# Patient Record
Sex: MALE | NOT HISPANIC OR LATINO | Employment: FULL TIME | URBAN - METROPOLITAN AREA
[De-identification: names, ages, dates, MRNs, and addresses within clinical notes are randomized per-mention and may not be internally consistent; named-entity substitution may affect disease eponyms.]

---

## 2019-07-31 ENCOUNTER — OFFICE VISIT (OUTPATIENT)
Dept: FAMILY MEDICINE | Facility: CLINIC | Age: 39
End: 2019-07-31
Payer: COMMERCIAL

## 2019-07-31 VITALS
DIASTOLIC BLOOD PRESSURE: 69 MMHG | HEART RATE: 69 BPM | HEIGHT: 66 IN | WEIGHT: 202 LBS | BODY MASS INDEX: 32.47 KG/M2 | SYSTOLIC BLOOD PRESSURE: 109 MMHG | TEMPERATURE: 97.4 F

## 2019-07-31 DIAGNOSIS — M54.50 CHRONIC LEFT-SIDED LOW BACK PAIN WITHOUT SCIATICA: Primary | ICD-10-CM

## 2019-07-31 DIAGNOSIS — R07.9 CHEST PAIN, UNSPECIFIED TYPE: ICD-10-CM

## 2019-07-31 DIAGNOSIS — G89.29 CHRONIC LEFT-SIDED LOW BACK PAIN WITHOUT SCIATICA: Primary | ICD-10-CM

## 2019-07-31 PROCEDURE — 99203 OFFICE O/P NEW LOW 30 MIN: CPT | Performed by: PHYSICIAN ASSISTANT

## 2019-07-31 RX ORDER — METHOCARBAMOL 750 MG/1
750 TABLET, FILM COATED ORAL 4 TIMES DAILY PRN
Qty: 30 TABLET | Refills: 0 | Status: SHIPPED | OUTPATIENT
Start: 2019-07-31 | End: 2021-06-22

## 2019-07-31 ASSESSMENT — MIFFLIN-ST. JEOR: SCORE: 1761.08

## 2019-07-31 NOTE — PROGRESS NOTES
"Subjective     Maverick Richards is a 39 year old male who presents to clinic today for the following health issues:    HPI   ABDOMINAL   PAIN     Onset: 1 year off and on     Description:   Character: Sharp and Cramping  Location: left upper quadrant  Radiation: Back  L BACK     Intensity: 5-6 /10    Progression of Symptoms:  worsening    Accompanying Signs & Symptoms:  Fever/Chills?: no   Gas/Bloating: YES- SOMETIMES  Nausea: no   Vomitting: no   Diarrhea?: no   Constipation:no   Dysuria or Hematuria: no    History:   Trauma: no   Previous similar pain: no    Previous tests done: none    Precipitating factors:   Does the pain change with:     Food: YES- MAYBE      BM: no     Urination: no     Alleviating factors:  None     Therapies Tried and outcome none     LMP:  {NOT applicable:539035::\"not applicable\"}     {additonal problems for provider to add (Optional):792797}    {HIST REVIEW/ LINKS 2 (Optional):955849}    {Additional problems for the provider to add (optional):064762}  Reviewed and updated as needed this visit by Provider         Review of Systems   {ROS COMP (Optional):583312}      Objective    There were no vitals taken for this visit.  There is no height or weight on file to calculate BMI.  Physical Exam   {Exam List (Optional):683108}    {Diagnostic Test Results (Optional):035675::\"Diagnostic Test Results:\",\"Labs reviewed in Epic\"}        {PROVIDER CHARTING PREFERENCE:775800}      "

## 2019-07-31 NOTE — PROGRESS NOTES
"Subjective     Maverick Richards is a 39 year old male who presents to clinic today for the following health issues:    HPI   FLANK   PAIN     Onset: off and on 1 year     Description:   Character: Dull ache and Cramping  Location: left upper quadrant  Radiation: Back    Intensity: 5-6/10    Progression of Symptoms:  worsening    Accompanying Signs & Symptoms:  Fever/Chills?: no   Gas/Bloating: YES- sometimes   Nausea: no   Vomitting: no   Diarrhea?: no   Constipation:no   Dysuria or Hematuria: no    History:   Trauma: no   Previous similar pain: no    Previous tests done: none    Precipitating factors:   Does the pain change with:     Food: YES- not sure      BM: no     Urination: no     Alleviating factors:  none    Therapies Tried and outcome: none     LMP:  not applicable   Sometimes feels a spasm.  No known injury.    No alcohol for a year.    No constipation   No radiation of pain.            Also mentions some chest pain in the last few months.  Chest pain is at rest.  Feels tight.  No cough.  Quit smoking.  No family hx of heart disease on moms side.  Dads side unknown.  Hasn't paid attention to many of the symptoms related to this pain.          Patient Active Problem List   Diagnosis     CARDIOVASCULAR SCREENING; LDL GOAL LESS THAN 160     Past Surgical History:   Procedure Laterality Date     ENT SURGERY      tonsilectomy age 1       Social History     Tobacco Use     Smoking status: Former Smoker     Smokeless tobacco: Never Used   Substance Use Topics     Alcohol use: Not Currently     Comment: socially 6 a week     History reviewed. No pertinent family history.          Reviewed and updated as needed this visit by Provider  Allergies  Meds         Review of Systems   As above      Objective    /69   Pulse 69   Temp 97.4  F (36.3  C) (Oral)   Ht 1.664 m (5' 5.5\")   Wt 91.6 kg (202 lb)   BMI 33.10 kg/m    Body mass index is 33.1 kg/m .  Physical Exam   Constitutional: He appears well-developed " "and well-nourished. No distress.   Cardiovascular: Normal rate, regular rhythm and normal heart sounds.   Pulmonary/Chest: Effort normal and breath sounds normal. He exhibits no tenderness.   Abdominal: Soft. Normal appearance and bowel sounds are normal. There is no tenderness (no cva tenderness ).   Musculoskeletal:        Right shoulder: Normal.        Left shoulder: Normal.        Lumbar back: Normal.        Back:    Neurological: He has normal strength.   Reflex Scores:       Patellar reflexes are 2+ on the right side and 2+ on the left side.                 Assessment & Plan     1. Chronic left-sided low back pain without sciatica  Msk.  Continue symptomatic treatment.  return to clinic if not improving.     - methocarbamol (ROBAXIN) 750 MG tablet; Take 1 tablet (750 mg) by mouth 4 times daily as needed for muscle spasms  Dispense: 30 tablet; Refill: 0    2. Chest pain, unspecified type  Does not seem to be cardiac in nature.  Discussed when to go to ER.         BMI:   Estimated body mass index is 33.1 kg/m  as calculated from the following:    Height as of this encounter: 1.664 m (5' 5.5\").    Weight as of this encounter: 91.6 kg (202 lb).               Return in about 4 weeks (around 8/28/2019) for if not improving.    Balbina Alva PA-C  [facility]"

## 2020-01-30 ENCOUNTER — ANCILLARY PROCEDURE (OUTPATIENT)
Dept: GENERAL RADIOLOGY | Facility: CLINIC | Age: 40
End: 2020-01-30
Attending: INTERNAL MEDICINE
Payer: COMMERCIAL

## 2020-01-30 ENCOUNTER — OFFICE VISIT (OUTPATIENT)
Dept: RHEUMATOLOGY | Facility: CLINIC | Age: 40
End: 2020-01-30
Payer: COMMERCIAL

## 2020-01-30 VITALS
DIASTOLIC BLOOD PRESSURE: 68 MMHG | OXYGEN SATURATION: 99 % | BODY MASS INDEX: 34.01 KG/M2 | HEIGHT: 66 IN | HEART RATE: 71 BPM | WEIGHT: 211.6 LBS | SYSTOLIC BLOOD PRESSURE: 115 MMHG

## 2020-01-30 DIAGNOSIS — M25.50 MULTIPLE JOINT PAIN: Primary | ICD-10-CM

## 2020-01-30 DIAGNOSIS — Z87.19 HISTORY OF BLOODY STOOLS: ICD-10-CM

## 2020-01-30 DIAGNOSIS — M25.50 MULTIPLE JOINT PAIN: ICD-10-CM

## 2020-01-30 LAB
ALBUMIN SERPL-MCNC: 3.9 G/DL (ref 3.4–5)
ALP SERPL-CCNC: 64 U/L (ref 40–150)
ALT SERPL W P-5'-P-CCNC: 37 U/L (ref 0–70)
AST SERPL W P-5'-P-CCNC: 22 U/L (ref 0–45)
BASOPHILS # BLD AUTO: 0 10E9/L (ref 0–0.2)
BASOPHILS NFR BLD AUTO: 0.5 %
BILIRUB DIRECT SERPL-MCNC: 0.1 MG/DL (ref 0–0.2)
BILIRUB SERPL-MCNC: 0.5 MG/DL (ref 0.2–1.3)
CREAT SERPL-MCNC: 0.9 MG/DL (ref 0.66–1.25)
CRP SERPL-MCNC: 3.7 MG/L (ref 0–8)
DIFFERENTIAL METHOD BLD: NORMAL
EOSINOPHIL # BLD AUTO: 0.2 10E9/L (ref 0–0.7)
EOSINOPHIL NFR BLD AUTO: 2.5 %
ERYTHROCYTE [DISTWIDTH] IN BLOOD BY AUTOMATED COUNT: 13.1 % (ref 10–15)
ERYTHROCYTE [SEDIMENTATION RATE] IN BLOOD BY WESTERGREN METHOD: 5 MM/H (ref 0–15)
GFR SERPL CREATININE-BSD FRML MDRD: >90 ML/MIN/{1.73_M2}
HCT VFR BLD AUTO: 43.6 % (ref 40–53)
HGB BLD-MCNC: 14.3 G/DL (ref 13.3–17.7)
LYMPHOCYTES # BLD AUTO: 3.2 10E9/L (ref 0.8–5.3)
LYMPHOCYTES NFR BLD AUTO: 48.7 %
MCH RBC QN AUTO: 28.6 PG (ref 26.5–33)
MCHC RBC AUTO-ENTMCNC: 32.8 G/DL (ref 31.5–36.5)
MCV RBC AUTO: 87 FL (ref 78–100)
MONOCYTES # BLD AUTO: 0.5 10E9/L (ref 0–1.3)
MONOCYTES NFR BLD AUTO: 7.7 %
NEUTROPHILS # BLD AUTO: 2.6 10E9/L (ref 1.6–8.3)
NEUTROPHILS NFR BLD AUTO: 40.6 %
PLATELET # BLD AUTO: 265 10E9/L (ref 150–450)
PROT SERPL-MCNC: 7.5 G/DL (ref 6.8–8.8)
RBC # BLD AUTO: 5 10E12/L (ref 4.4–5.9)
RHEUMATOID FACT SER NEPH-ACNC: <20 IU/ML (ref 0–20)
WBC # BLD AUTO: 6.5 10E9/L (ref 4–11)

## 2020-01-30 PROCEDURE — 86431 RHEUMATOID FACTOR QUANT: CPT | Performed by: INTERNAL MEDICINE

## 2020-01-30 PROCEDURE — 86803 HEPATITIS C AB TEST: CPT | Performed by: INTERNAL MEDICINE

## 2020-01-30 PROCEDURE — 82306 VITAMIN D 25 HYDROXY: CPT | Performed by: INTERNAL MEDICINE

## 2020-01-30 PROCEDURE — 86200 CCP ANTIBODY: CPT | Performed by: INTERNAL MEDICINE

## 2020-01-30 PROCEDURE — 71046 X-RAY EXAM CHEST 2 VIEWS: CPT

## 2020-01-30 PROCEDURE — 87340 HEPATITIS B SURFACE AG IA: CPT | Performed by: INTERNAL MEDICINE

## 2020-01-30 PROCEDURE — 36415 COLL VENOUS BLD VENIPUNCTURE: CPT | Performed by: INTERNAL MEDICINE

## 2020-01-30 PROCEDURE — 86140 C-REACTIVE PROTEIN: CPT | Performed by: INTERNAL MEDICINE

## 2020-01-30 PROCEDURE — 73130 X-RAY EXAM OF HAND: CPT | Mod: LT

## 2020-01-30 PROCEDURE — 86039 ANTINUCLEAR ANTIBODIES (ANA): CPT | Performed by: INTERNAL MEDICINE

## 2020-01-30 PROCEDURE — 80076 HEPATIC FUNCTION PANEL: CPT | Performed by: INTERNAL MEDICINE

## 2020-01-30 PROCEDURE — 82565 ASSAY OF CREATININE: CPT | Performed by: INTERNAL MEDICINE

## 2020-01-30 PROCEDURE — 85025 COMPLETE CBC W/AUTO DIFF WBC: CPT | Performed by: INTERNAL MEDICINE

## 2020-01-30 PROCEDURE — 86704 HEP B CORE ANTIBODY TOTAL: CPT | Performed by: INTERNAL MEDICINE

## 2020-01-30 PROCEDURE — 72200 X-RAY EXAM SI JOINTS: CPT

## 2020-01-30 PROCEDURE — 99204 OFFICE O/P NEW MOD 45 MIN: CPT | Performed by: INTERNAL MEDICINE

## 2020-01-30 PROCEDURE — 85652 RBC SED RATE AUTOMATED: CPT | Performed by: INTERNAL MEDICINE

## 2020-01-30 PROCEDURE — 73630 X-RAY EXAM OF FOOT: CPT | Mod: LT

## 2020-01-30 PROCEDURE — 86038 ANTINUCLEAR ANTIBODIES: CPT | Performed by: INTERNAL MEDICINE

## 2020-01-30 PROCEDURE — 86618 LYME DISEASE ANTIBODY: CPT | Performed by: INTERNAL MEDICINE

## 2020-01-30 RX ORDER — PREDNISONE 20 MG/1
20 TABLET ORAL DAILY
Qty: 5 TABLET | Refills: 0 | Status: SHIPPED | OUTPATIENT
Start: 2020-01-30 | End: 2020-02-04

## 2020-01-30 ASSESSMENT — MIFFLIN-ST. JEOR: SCORE: 1810.97

## 2020-01-30 NOTE — PATIENT INSTRUCTIONS
Rheumatology    Dr. Miguel Huerta       [facility] in [place]   (Monday)  [address]  [place] [address]       [facility] in [employer] [place]   (Tuesday)  [address] [place] [address]    [facility] in [place]   (Wed., Thurs., and Friday)  [address] [place] [address]    Phone number: [phone]  Thank you for choosing [facility].  Jade Laureano CMA

## 2020-01-30 NOTE — PROGRESS NOTES
Rheumatology Clinic Visit      Maverick Richards MRN# [id number]   YOB: 1980 Age: 39 year old      Date of visit: 1/30/20   PCP: Balbina Alva    Chief Complaint   Patient presents with:  Consult: right arm from fingers to elbow hurt, wrist is bad. Left also hurts but not as bad. Knees and back sometimes hurt    Assessment and Plan     1.  Multiple joint pain: Mixed inflammatory and degenerative arthritis symptoms.  Question if there is a component of inflammatory arthritis given the symmetric distribution.  Also question if the axial symptoms are inflammatory or not and may need an MRI if x-ray is not revealing.  Prolonged morning stiffness with positive gelling phenomenon.  No history of inflammatory eye disease or inflammatory bowel disease.  Labs and x-rays as noted below.  - Anti Nuclear Cele IgG by IFA with Reflex  - Cyclic Citrullinated Peptide Antibody IgG  - Rheumatoid factor  - Hepatitis C Screen Reflex to HCV RNA Quant and Genotype  - Hepatitis B surface antigen  - Hepatitis B core antibody  - Lyme Disease Cele with reflex to WB Serum  - CBC with platelets differential  - CRP inflammation  - Hepatic panel  - Erythrocyte sedimentation rate auto  - Creatinine  - Vitamin D Deficiency  - XR Hand Bilateral G/E 3 Views; Future  - XR Foot Bilateral G/E 3 Views; Future  - XR Chest 2 Views; Future  - XR Sacroiliac Joint 1/2 Views; Future  - predniSONE (DELTASONE) 20 MG tablet; Take 1 tablet (20 mg) by mouth daily for 5 days  Dispense: 5 tablet; Refill: 0    2.  Intermittent bright red blood and black stools, reportedly 1-2 times per month for a couple years: Needs GI evaluation.  - Gastroenterology referral    Mr. Richards verbalized agreement with and understanding of the rational for the diagnosis and treatment plan.  All questions were answered to best of my ability and the patient's satisfaction. Mr. Richards was advised to contact the clinic with any questions that may arise after the clinic visit.       Thank you for involving me in the care of the patient    Return to clinic: 1 week      HPI   Maverick Richards is a 39 year old male who presents for evaluation of multiple joint pain.    Consult: right arm from fingers to elbow hurt, wrist is bad. Left also hurts but not as bad. Knees and back sometimes hurt    Today, Mr. Richards reports 5 years of right wrist pain, worse with certain movements that will cause pain for 1-7days that gradually improves and then another thing will trigger it again; was a period of a doing okay with a couple months then he openned the refrigerator door and it happened again; doesn't swell; maybe increased warmth; sharp shooting pain; pain will radiate to the dorsal right 2nd-4th fingers and sometimes to the volar forearm just proximal to the right wrist.  L>R shoulder pain with certain positions and he demonstrates pain with abduction. R>L MCPs>PIPs>DIPs hurt; more pain in the AM, repetitive fine motor movement at work that worsens his pain; when doing heavy lifting prior to this job he had similar symptoms and would need to adjust how he lifted things. Elbows hurt sometimes. Hips are okay.  Knees hurt at the end of the day; improve with rest; no swelling.  Ankles are worse with activity; improve with rest; long history of spraining ankles over and over again; says that he has been eval'd several times including orthopedics and has been told to rest, splint sometimes with ACE bandages, and ice.  Toes are okay.  Morning stiffness for 1 hour, better with a hot shower; +gelling.  He then says that while he is active during the day he feels so much better, and if he sits down to rest then he feels terrible because it is so hard to get going again.  He says that when he gets home he has to get everything done right away because if he sits to relax then it is hard to get up again.     Denies fevers, chills, nausea, vomiting, constipation, diarrhea. No abdominal pain.  He notes having  intermittent bright red blood and black stools, usually about 1-2 times per month for couple years now.  No chest pain/pressure, palpitations, or shortness of breath. No LE swelling. No neck pain. No oral or nasal sores.  No rash. No sicca symptoms. No photosensitivity or photophobia. No eye pain or redness. No history of inflammatory eye disease.  No history of DVT or pulmonary embolism.  No history of serositis.  Sometimes mild distal white color change with cold exposure but no other color change and no associated pain.  No known neurologic disorder.  No known renal disorder.      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 No oral or nasal ulcers.  CV: No chest pain, pressure, palpitations, or dyspnea on exertion.  PULM: No SOB, wheeze, cough.  GI: See HPI  : No blood in urine.  MSK: See HPI.  NEURO: No numbness, tingling, or weakness.  ENDO: No heat/cold intolerance.  EXT: No LE swelling  PSYCH: Negative    Active Problem List     Patient Active Problem List   Diagnosis     CARDIOVASCULAR SCREENING; LDL GOAL LESS THAN 160     Past Medical History   No past medical history on file.  Past Surgical History     Past Surgical History:   Procedure Laterality Date     ENT SURGERY      tonsilectomy age 1     Allergy   No Known Allergies  Current Medication List     Current Outpatient Medications   Medication Sig     methocarbamol (ROBAXIN) 750 MG tablet Take 1 tablet (750 mg) by mouth 4 times daily as needed for muscle spasms     No current facility-administered medications for this visit.          Social History   See HPI    Family History   No known family history of rheumatologic disorder    Physical Exam     Temp Readings from Last 3 Encounters:   07/31/19 97.4  F (36.3  C) (Oral)  "  10/06/14 97.3  F (36.3  C) (Oral)   10/01/14 97.8  F (36.6  C) (Oral)     BP Readings from Last 5 Encounters:   07/31/19 109/69   10/06/14 127/75   10/01/14 120/84     Pulse Readings from Last 1 Encounters:   07/31/19 69     Resp Readings from Last 1 Encounters:   No data found for Resp     Estimated body mass index is 33.1 kg/m  as calculated from the following:    Height as of 7/31/19: 1.664 m (5' 5.5\").    Weight as of 7/31/19: 91.6 kg (202 lb).    GEN: NAD  HEENT: MMM. No oral lesions. Anicteric, noninjected sclera  CV: S1, S2. RRR. No m/r/g.  PULM: CTA bilaterally. No w/c.  ABD: +BS. Soft. NT/ND. No r/g.  MSK: Bilateral second and fourth MCPs and PIPs are mildly tender to palpation; possible subtle synovial swelling of the bilateral second and third MCPs.  Wrists mildly tender to palpation and possible subtle synovial swelling.  Elbows, shoulders, knees, and ankles without swelling or tenderness palpation.  MTPs diffusely tender to palpation but without swelling.  Achilles tendons nontender to palpation.  No dactylitis.    NEURO: UE and LE strengths 5/5 and equal bilaterally.   SKIN: No rash  EXT: No LE edema  PSYCH: Alert. Appropriate.    Labs / Imaging (select studies)     CMP  Recent Labs   Lab Test 10/06/14  1009      POTASSIUM 4.1   CHLORIDE 103   CO2 26   ANIONGAP 7   GLC 80   BUN 10   CR 0.95   GFRESTIMATED >90  Non  GFR Calc     GFRESTBLACK >90   GFR Calc     KALEY 9.6   BILITOTAL 0.3   ALBUMIN 4.1   PROTTOTAL 8.0   ALKPHOS 75   AST 23   ALT 40     Calcium/VitaminD  Recent Labs   Lab Test 10/06/14  1009   KALEY 9.6     TSH/T4  Recent Labs   Lab Test 10/06/14  1009   TSH 2.27     Immunization History     There is no immunization history on file for this patient.       Chart documentation done in part with Dragon Voice recognition Software. Although reviewed after completion, some word and grammatical error may remain.    Miguel Huerta MD    "

## 2020-01-31 LAB
ANA PAT SER IF-IMP: ABNORMAL
ANA SER QL IF: ABNORMAL
ANA TITR SER IF: ABNORMAL {TITER}
B BURGDOR IGG+IGM SER QL: 0.19 (ref 0–0.89)
CCP AB SER IA-ACNC: 1 U/ML
DEPRECATED CALCIDIOL+CALCIFEROL SERPL-MC: 10 UG/L (ref 20–75)
HBV CORE AB SERPL QL IA: NONREACTIVE
HBV SURFACE AG SERPL QL IA: NONREACTIVE
HCV AB SERPL QL IA: NONREACTIVE

## 2020-02-05 ENCOUNTER — OFFICE VISIT (OUTPATIENT)
Dept: RHEUMATOLOGY | Facility: CLINIC | Age: 40
End: 2020-02-05
Payer: COMMERCIAL

## 2020-02-05 VITALS
HEART RATE: 76 BPM | SYSTOLIC BLOOD PRESSURE: 128 MMHG | BODY MASS INDEX: 35.03 KG/M2 | OXYGEN SATURATION: 98 % | WEIGHT: 216.4 LBS | DIASTOLIC BLOOD PRESSURE: 80 MMHG

## 2020-02-05 DIAGNOSIS — M46.1 SACROILIITIS (H): ICD-10-CM

## 2020-02-05 DIAGNOSIS — Z79.899 HIGH RISK MEDICATION USE: ICD-10-CM

## 2020-02-05 DIAGNOSIS — M47.819 SPONDYLOARTHROPATHY: Primary | ICD-10-CM

## 2020-02-05 DIAGNOSIS — E55.9 VITAMIN D DEFICIENCY: ICD-10-CM

## 2020-02-05 PROCEDURE — 86481 TB AG RESPONSE T-CELL SUSP: CPT | Performed by: INTERNAL MEDICINE

## 2020-02-05 PROCEDURE — 99214 OFFICE O/P EST MOD 30 MIN: CPT | Performed by: INTERNAL MEDICINE

## 2020-02-05 PROCEDURE — 81374 HLA I TYPING 1 ANTIGEN LR: CPT | Performed by: INTERNAL MEDICINE

## 2020-02-05 PROCEDURE — 36415 COLL VENOUS BLD VENIPUNCTURE: CPT | Performed by: INTERNAL MEDICINE

## 2020-02-05 RX ORDER — ERGOCALCIFEROL 1.25 MG/1
50000 CAPSULE, LIQUID FILLED ORAL
Qty: 12 CAPSULE | Refills: 0 | Status: SHIPPED | OUTPATIENT
Start: 2020-02-05 | End: 2020-05-06

## 2020-02-05 NOTE — PROGRESS NOTES
Rheumatology Clinic Visit      Maverick Richards MRN# [id number]   YOB: 1980 Age: 39 year old      Date of visit: 2/05/20   PCP: Balbina Alva    Chief Complaint   Patient presents with:  RECHECK: follow up    Assessment and Plan     1.  Spondyloarthropathy, axial and peripheral involvement: R>L MCP, PIP, DIP joint involvement that is inflammatory in nature.  Ankle and knee pain is mechanical in nature.  Axial symptoms had mixed inflammatory and degenerative symptoms; right sacroiliitis suspected based on x-rays.  Additionally, he has a history of bright red blood and black stools 1-2 times per month for the past 2 years and this in the setting of inflammatory arthritis raises the concern for an inflammatory bowel disease so he has been previously referred to gastroenterology and he says he already has an appointment scheduled for next week.  Reviewed his diagnosis and treatment options.  Because of the bright red blood in his stool and black stools there is concern for inflammatory bowel disease or gastric ulcer and will therefore avoid NSAIDs.  Because of axial disease we will then proceed to a TNF inhibitor.  We discussed Humira in detail today.  Start Humira if TB screening is negative  - Lab today: quantiferon TB gold plus, HLA-B27  - Start Humira 40mg SQ every 14 days if TB screening is negative  - Labs in 3 months: CBC, Creatinine, Hepatic Panel, ESR, CRP    # Adalimumab (Humira) Risks and Benefits: The risks and benefits of adalimumab were discussed in detail and the patient verbalized understanding.  The risks discussed include, but are not limited to, the risk for hypersensitivity, anaphylaxis, anaphylactoid reactions, an increased risk for serious infections leading to hospitalization or death, a possible increased risk for lymphoma and other malignancies, a possible worsening of demyelinating diseases, a possible worsening of heart failure, risk for cytopenias, risk for drug induced lupus,  possible reactivation of hepatitis B, and possible reactivation of latent tuberculosis.  Subcutaneous injections may result in injection site reactions and/or pain at the site of injection.  The most common adverse reactions are infections, injection site reactions, headache, and rash.  It was discussed that the medication would need to be discontinued if a serious infection develops.  It was discussed that live vaccinations should not be received while using adalimumab or within 30 days prior to starting adalimumab.  I encouraged reviewing the package insert and asking any questions about the medication.      Addendum: TB negative. Humira Rx'd.     2.  Intermittent bright red blood and black stools, reportedly 1-2 times per month for a couple years: Needs GI evaluation.  - Gastroenterology referral given previously and he reports having this scheduled already    3. Vitamin D deficiency: Start erogocalciferol 50,000 units once weekly  - Labs in 3 mo: vitamin D      Mr. Richards verbalized agreement with and understanding of the rational for the diagnosis and treatment plan.  All questions were answered to best of my ability and the patient's satisfaction. Mr. Richards was advised to contact the clinic with any questions that may arise after the clinic visit.      Thank you for involving me in the care of the patient    Return to clinic: 3 months      HPI   Maverick Richards is a 39 year old male who presents for evaluation of multiple joint pain.    Consult: right arm from fingers to elbow hurt, wrist is bad. Left also hurts but not as bad. Knees and back sometimes hurt    Previously, Mr. Richards reports 5 years of right wrist pain, worse with certain movements that will cause pain for 1-7days that gradually improves and then another thing will trigger it again; was a period of a doing okay with a couple months then he openned the refrigerator door and it happened again; doesn't swell; maybe increased warmth; sharp  shooting pain; pain will radiate to the dorsal right 2nd-4th fingers and sometimes to the volar forearm just proximal to the right wrist.  L>R shoulder pain with certain positions and he demonstrates pain with abduction. R>L MCPs>PIPs>DIPs hurt; more pain in the AM, repetitive fine motor movement at work that worsens his pain; when doing heavy lifting prior to this job he had similar symptoms and would need to adjust how he lifted things. Elbows hurt sometimes. Hips are okay.  Knees hurt at the end of the day; improve with rest; no swelling.  Ankles are worse with activity; improve with rest; long history of spraining ankles over and over again; says that he has been eval'd several times including orthopedics and has been told to rest, splint sometimes with ACE bandages, and ice.  Toes are okay.  Morning stiffness for 1 hour, better with a hot shower; +gelling.  He then says that while he is active during the day he feels so much better, and if he sits down to rest then he feels terrible because it is so hard to get going again.  He says that when he gets home he has to get everything done right away because if he sits to relax then it is hard to get up again.     Today, no change in symptoms.  Here to review results.    Denies fevers, chills, nausea, vomiting, constipation, diarrhea. No abdominal pain.  He notes having intermittent bright red blood and black stools, usually about 1-2 times per month for couple years now.  No chest pain/pressure, palpitations, or shortness of breath. No LE swelling. No neck pain. No oral or nasal sores.  No rash. No sicca symptoms. No photosensitivity or photophobia. No eye pain or redness. No history of inflammatory eye disease.  No history of DVT or pulmonary embolism.  No history of serositis.  Sometimes mild distal white color change with cold exposure but no other color change and no associated pain.  No known neurologic disorder.  No known renal disorder.      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 No oral or nasal ulcers.  CV: No chest pain, pressure, palpitations, or dyspnea on exertion.  PULM: No SOB, wheeze, cough.  GI: See HPI  : No blood in urine.  MSK: See HPI.  NEURO: No numbness, tingling, or weakness.  ENDO: No heat/cold intolerance.  EXT: No LE swelling  PSYCH: Negative    Active Problem List     Patient Active Problem List   Diagnosis     CARDIOVASCULAR SCREENING; LDL GOAL LESS THAN 160     Past Medical History   No past medical history on file.  Past Surgical History     Past Surgical History:   Procedure Laterality Date     ENT SURGERY      tonsilectomy age 1     Allergy   No Known Allergies  Current Medication List     Current Outpatient Medications   Medication Sig     medical cannabis (Patient's own supply) See Admin Instructions (The purpose of this order is to document that the patient reports taking medical cannabis.  This is not a prescription, and is not used to certify that the patient has a qualifying medical condition.)     vitamin D2 (ERGOCALCIFEROL) 52748 units (1250 mcg) capsule Take 1 capsule (50,000 Units) by mouth every 7 days     methocarbamol (ROBAXIN) 750 MG tablet Take 1 tablet (750 mg) by mouth 4 times daily as needed for muscle spasms (Patient not taking: Reported on 1/30/2020)     No current facility-administered medications for this visit.          Social History   See HPI    Family History   No known family history of rheumatologic disorder    Physical Exam     Temp Readings from Last 3 Encounters:   07/31/19 97.4  F (36.3  C) (Oral)   10/06/14 97.3  F (36.3  C) (Oral)   10/01/14 97.8  F (36.6  C) (Oral)     BP Readings from Last 5 Encounters:   02/05/20 128/80   01/30/20 115/68   07/31/19 109/69  "  10/06/14 127/75   10/01/14 120/84     Pulse Readings from Last 1 Encounters:   02/05/20 76     Resp Readings from Last 1 Encounters:   No data found for Resp     Estimated body mass index is 35.03 kg/m  as calculated from the following:    Height as of 1/30/20: 1.674 m (5' 5.9\").    Weight as of this encounter: 98.2 kg (216 lb 6.4 oz).    GEN: NAD  HEENT: MMM. No oral lesions. Anicteric, noninjected sclera  CV: S1, S2. RRR. No m/r/g.  PULM: CTA bilaterally. No w/c.  ABD: +BS. Soft. NT/ND. No r/g.  MSK: Bilateral second and fourth MCPs and PIPs are mildly tender to palpation; possible subtle synovial swelling of the bilateral second and third MCPs.  Wrists mildly tender to palpation and possible subtle synovial swelling.  Elbows, shoulders, knees, and ankles without swelling or tenderness palpation.  MTPs diffusely tender to palpation but without swelling.  Achilles tendons nontender to palpation.  No dactylitis.    NEURO: UE and LE strengths 5/5 and equal bilaterally.   SKIN: No rash  EXT: No LE edema  PSYCH: Alert. Appropriate.    Labs / Imaging (select studies)   RF/CCP  Recent Labs   Lab Test 01/30/20  0950   CCPIGG 1   RHF <20     CBC  Recent Labs   Lab Test 01/30/20  0950   WBC 6.5   RBC 5.00   HGB 14.3   HCT 43.6   MCV 87   RDW 13.1      MCH 28.6   MCHC 32.8   NEUTROPHIL 40.6   LYMPH 48.7   MONOCYTE 7.7   EOSINOPHIL 2.5   BASOPHIL 0.5   ANEU 2.6   ALYM 3.2   MASTER 0.5   AEOS 0.2   ABAS 0.0     CMP  Recent Labs   Lab Test 01/30/20  0950 10/06/14  1009   NA  --  136   POTASSIUM  --  4.1   CHLORIDE  --  103   CO2  --  26   ANIONGAP  --  7   GLC  --  80   BUN  --  10   CR 0.90 0.95   GFRESTIMATED >90 >90  Non African American GFR Calc     GFRESTBLACK >90 >90  African American GFR Calc     KALEY  --  9.6   BILITOTAL 0.5 0.3   ALBUMIN 3.9 4.1   PROTTOTAL 7.5 8.0   ALKPHOS 64 75   AST 22 23   ALT 37 40     Calcium/VitaminD  Recent Labs   Lab Test 01/30/20  0950 10/06/14  1009   KALEY  --  9.6   VITDT 10*  --  "     ESR/CRP  Recent Labs   Lab Test 01/30/20  0950   SED 5   CRP 3.7     Hepatitis B  Recent Labs   Lab Test 01/30/20  0950   HBCAB Nonreactive   HEPBANG Nonreactive     Hepatitis C  Recent Labs   Lab Test 01/30/20  0950   HCVAB Nonreactive     Lyme ab screening  Recent Labs   Lab Test 01/30/20  0950   LYMEGM 0.19     Recent Results (from the past 744 hour(s))   XR Sacroiliac Joint 1/2 Views    Narrative    SACROILIAC JOINT ONE TO TWO VIEWS   1/30/2020 10:36 AM     HISTORY:  Sacroiliitis question. Multiple joint pain.      Impression    IMPRESSION: I suspect mild right sacroiliitis versus degenerative  change.    MARIAN MARSHALL MD   XR Chest 2 Views    Narrative    XR CHEST 2 VW   1/30/2020 10:36 AM    INDICATION: Multiple joint pain.    COMPARISON: No pertinent comparison study is available for review.    FINDINGS:   No focal pulmonary infiltrate, pleural effusion, or pneumothorax. The  cardiac size and mediastinal contours appear within normal limits.     No acute fracture or dislocation. No discrete osseous lesion or  radiopaque foreign body.     CONCLUSION:  No acute abnormality.    ELIZABETH KAT MD   XR Foot Bilateral G/E 3 Views    Narrative    FOOT BILATERAL THREE OR MORE VIEWS   1/30/2020 11:29 AM     HISTORY:  Polyarticular joint pain; evaluating for inflammatory  arthritis or other etiology to explain symptoms. Multiple joint pain.      Impression    IMPRESSION: Unremarkable exam.    MARIAN MARSHALL MD   XR Hand Bilateral G/E 3 Views    Narrative    HAND BILATERAL THREE OR MORE VIEWS 1/30/2020 11:29 AM     HISTORY: Polyarticular joint pain. Evaluating for inflammatory  arthritis or other etiology to explain symptoms. Multiple joint pain.      Impression    IMPRESSION:   1. Left: Positive ulnar variance. 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  2. Right: Unremarkable.    MARIAN MARSHALL MD         Immunization History     There is no immunization history on file for this patient.       Chart documentation done in part  with Dragon Voice recognition Software. Although reviewed after completion, some word and grammatical error may remain.    Miguel Huerta MD

## 2020-02-05 NOTE — PROGRESS NOTES
RAPID3 (0-30) Cumulative Score  12.3          RAPID3 Weighted Score (divide #4 by 3 and that is the weighted score)  4.1     Magdalena Terrazas, CMA  2/5/2020  2:43 PM

## 2020-02-07 LAB
GAMMA INTERFERON BACKGROUND BLD IA-ACNC: 0.23 IU/ML
M TB IFN-G BLD-IMP: NEGATIVE
M TB IFN-G CD4+ BCKGRND COR BLD-ACNC: >10 IU/ML
MITOGEN IGNF BCKGRD COR BLD-ACNC: 0.05 IU/ML
MITOGEN IGNF BCKGRD COR BLD-ACNC: 0.07 IU/ML

## 2020-02-10 LAB
B LOCUS: NORMAL
B27TEST METHOD: NORMAL

## 2020-02-12 NOTE — RESULT ENCOUNTER NOTE
"MyChart message sent:  \"Mr. Richards,    The tuberculosis test was negative.  I have prescribed Humira to the specialty pharmacy.  You will receive a call from the pharmacy liaison when the insurance prior authorization is completed.     Sincerely,  Miguel Huerta MD  2/12/2020 5:00 PM\""

## 2020-02-13 ENCOUNTER — TELEPHONE (OUTPATIENT)
Dept: RHEUMATOLOGY | Facility: CLINIC | Age: 40
End: 2020-02-13

## 2020-02-13 NOTE — TELEPHONE ENCOUNTER
PA Initiation    Medication: humira   Insurance Company: [facility] - Phone [phone] Fax [phone]  Pharmacy Filling the Rx: [facility] - [place], [place] - [facility] [address]  Filling Pharmacy Phone:    Filling Pharmacy Fax:    Start Date: 2/13/2020

## 2020-02-13 NOTE — TELEPHONE ENCOUNTER
Prior Authorization Approval    Authorization Effective Date: 2/13/2020  Authorization Expiration Date: 2/13/2021  Medication: humira   Approved Dose/Quantity: 2/28ds  Reference #: [id number]   Insurance Company: [employer] [place] - Phone [phone] Fax [phone]  Expected CoPay: $739.41 $5 with copay card      CoPay Card Available: Yes    Foundation Assistance Needed: na  Which Pharmacy is filling the prescription (Not needed for infusion/clinic administered): [facility] - [place], [place] - [address]   Pharmacy Notified: Yes  Patient Notified: Yes

## 2020-02-16 ENCOUNTER — HEALTH MAINTENANCE LETTER (OUTPATIENT)
Age: 40
End: 2020-02-16

## 2020-03-23 ENCOUNTER — TRANSFERRED RECORDS (OUTPATIENT)
Dept: HEALTH INFORMATION MANAGEMENT | Facility: CLINIC | Age: 40
End: 2020-03-23

## 2020-05-06 ENCOUNTER — VIRTUAL VISIT (OUTPATIENT)
Dept: RHEUMATOLOGY | Facility: CLINIC | Age: 40
End: 2020-05-06
Payer: COMMERCIAL

## 2020-05-06 DIAGNOSIS — E55.9 VITAMIN D DEFICIENCY: ICD-10-CM

## 2020-05-06 DIAGNOSIS — M46.1 SACROILIITIS (H): ICD-10-CM

## 2020-05-06 DIAGNOSIS — M47.819 SPONDYLOARTHROPATHY: Primary | ICD-10-CM

## 2020-05-06 PROCEDURE — 99213 OFFICE O/P EST LOW 20 MIN: CPT | Mod: 95 | Performed by: INTERNAL MEDICINE

## 2020-05-06 RX ORDER — ERGOCALCIFEROL 1.25 MG/1
50000 CAPSULE, LIQUID FILLED ORAL
Qty: 12 CAPSULE | Refills: 0 | Status: SHIPPED | OUTPATIENT
Start: 2020-05-06 | End: 2020-09-25

## 2020-05-06 NOTE — PROGRESS NOTES
"Maverick Richards is a 39 year old male who is being evaluated via a billable telephone visit.      The patient has been notified of following:     \"This telephone visit will be conducted via a call between you and your physician/provider. We have found that certain health care needs can be provided without the need for a physical exam.  This service lets us provide the care you need with a short phone conversation.  If a prescription is necessary we can send it directly to your pharmacy.  If lab work is needed we can place an order for that and you can then stop by our lab to have the test done at a later time.    Telephone visits are billed at different rates depending on your insurance coverage. During this emergency period, for some insurers they may be billed the same as an in-person visit.  Please reach out to your insurance provider with any questions.    If during the course of the call the physician/provider feels a telephone visit is not appropriate, you will not be charged for this service.\"    Patient has given verbal consent for Telephone visit?  Yes    What phone number would you like to be contacted at? [phone]    How would you like to obtain your AVS? [facility]    Rheumatology Telephone/TeleHelath Visit      Maverick Richards MRN# [id number]   YOB: 1980 Age: 39 year old      Date of visit: 5/06/20   PCP: Balbina Alva    Chief Complaint   Patient presents with:  Spondyloarthropathy: good    Assessment and Plan     1.  Spondyloarthropathy, axial and peripheral involvement: R>L MCP, PIP, DIP joint involvement that is inflammatory in nature.  Ankle and knee pain is mechanical in nature.  Axial symptoms had mixed inflammatory and degenerative symptoms; right sacroiliitis suspected based on x-rays.  Additionally, he has a history of bright red blood and black stools 1-2 times per month for the past 2 years and this in the setting of inflammatory arthritis raises the concern for an " inflammatory bowel disease so he has been previously referred to gastroenterology and he is planning to have a colonoscopy post-COVID19.  Hasn't started Humira yet; discussed risks and benefits of Humira and he plans to call the pharmacy today to arrange delivery with plans to start Humira ASAP.   - Start Humira 40mg SQ every 14 days   - Labs in 3 months: CBC, Creatinine, Hepatic Panel, ESR, CRP    # Adalimumab (Humira) Risks and Benefits: The risks and benefits of adalimumab were discussed in detail and the patient verbalized understanding.  The risks discussed include, but are not limited to, the risk for hypersensitivity, anaphylaxis, anaphylactoid reactions, an increased risk for serious infections leading to hospitalization or death, a possible increased risk for lymphoma and other malignancies, a possible worsening of demyelinating diseases, a possible worsening of heart failure, risk for cytopenias, risk for drug induced lupus, possible reactivation of hepatitis B, and possible reactivation of latent tuberculosis.  Subcutaneous injections may result in injection site reactions and/or pain at the site of injection.  The most common adverse reactions are infections, injection site reactions, headache, and rash.  It was discussed that the medication would need to be discontinued if a serious infection develops.  It was discussed that live vaccinations should not be received while using adalimumab or within 30 days prior to starting adalimumab.  I encouraged reviewing the package insert and asking any questions about the medication.      2.  Intermittent bright red blood and black stools, reportedly 1-2 times per month for a couple years: Seen by Dr. Alban Alcantara at [facility]; planning for colonoscopy post-COVID19    3. Vitamin D deficiency: Continue erogocalciferol 50,000 units once weekly  - Labs in 3 mo: vitamin D    # Relevant labs and imaging were reviewed with the patient    # High risk medication toxicity  monitoring: discussion and labs reviewed; appropriate labs ordered. See above.  Instructed that if confirmed to have COVID-19 or exposure to someone with confirmed COVID-19 to call this clinic for directions on DMARD management.    # Note that this is a virtual visit to reduce the risk of COVID-19 exposure during this current pandemic.        Mr. Richards verbalized agreement with and understanding of the rational for the diagnosis and treatment plan.  All questions were answered to best of my ability and the patient's satisfaction. Mr. Richards was advised to contact the clinic with any questions that may arise after the clinic visit.      Thank you for involving me in the care of the patient    Return to clinic: 3 months      HPI     Maverick Richards is a 39 year old male who presents for follow-up of spondyloarthropathy    1/30/2020: Mr. Richards reported 5 years of right wrist pain, worse with certain movements that will cause pain for 1-7days that gradually improves and then another thing will trigger it again; was a period of a doing okay with a couple months then he openned the refrigerator door and it happened again; doesn't swell; maybe increased warmth; sharp shooting pain; pain will radiate to the dorsal right 2nd-4th fingers and sometimes to the volar forearm just proximal to the right wrist.  L>R shoulder pain with certain positions and he demonstrates pain with abduction. R>L MCPs>PIPs>DIPs hurt; more pain in the AM, repetitive fine motor movement at work that worsens his pain; when doing heavy lifting prior to this job he had similar symptoms and would need to adjust how he lifted things. Elbows hurt sometimes. Hips are okay.  Knees hurt at the end of the day; improve with rest; no swelling.  Ankles are worse with activity; improve with rest; long history of spraining ankles over and over again; says that he has been eval'd several times including orthopedics and has been told to rest, splint sometimes  with ACE bandages, and ice.  Toes are okay.  Morning stiffness for 1 hour, better with a hot shower; +gelling.  He then says that while he is active during the day he feels so much better, and if he sits down to rest then he feels terrible because it is so hard to get going again.  He says that when he gets home he has to get everything done right away because if he sits to relax then it is hard to get up again.     Today, 5/6/2020: no change in symptoms.  Didn't start Humira because of anticipation that it may change the colonoscopy results; wasn't concerned about COVID19 related issues.  Still with pain and stiffness and plans to start Humira for these symptoms as he doesn't know when the colonoscopy will be able to be done.     Denies fevers, chills, nausea, vomiting, constipation, diarrhea. No abdominal pain.  He notes having intermittent bright red blood and black stools, usually about 1-2 times per month for couple years now.  No chest pain/pressure, palpitations, or shortness of breath. No LE swelling. No neck pain. No oral or nasal sores.  No rash. No sicca symptoms. No photosensitivity or photophobia. No eye pain or redness. No history of inflammatory eye disease.  No history of DVT or pulmonary embolism.  No history of serositis.  Sometimes mild distal white color change with cold exposure but no other color change and no associated pain.  No known neurologic disorder.  No known renal disorder.      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 No oral or nasal ulcers.  CV: No chest pain, pressure, palpitations, or dyspnea on exertion.  PULM: No SOB, wheeze, cough.  GI: See HPI  : No blood in urine.  MSK: See HPI.  NEURO: No numbness, tingling, or weakness.  ENDO: No heat/cold intolerance.  EXT: No LE  "swelling  PSYCH: Negative    Active Problem List     Patient Active Problem List   Diagnosis     CARDIOVASCULAR SCREENING; LDL GOAL LESS THAN 160     Past Medical History   History reviewed. No pertinent past medical history.  Past Surgical History     Past Surgical History:   Procedure Laterality Date     ENT SURGERY      tonsilectomy age 1     Allergy   No Known Allergies  Current Medication List     Current Outpatient Medications   Medication Sig     medical cannabis (Patient's own supply) See Admin Instructions (The purpose of this order is to document that the patient reports taking medical cannabis.  This is not a prescription, and is not used to certify that the patient has a qualifying medical condition.)     vitamin D2 (ERGOCALCIFEROL) 72945 units (1250 mcg) capsule Take 1 capsule (50,000 Units) by mouth every 7 days     adalimumab (HUMIRA *CF*) 40 MG/0.4ML pen kit Inject 0.4 mLs (40 mg) Subcutaneous every 14 days . Hold for signs of infection, then seek medical attention. (Patient not taking: Reported on 5/6/2020)     methocarbamol (ROBAXIN) 750 MG tablet Take 1 tablet (750 mg) by mouth 4 times daily as needed for muscle spasms (Patient not taking: Reported on 1/30/2020)     No current facility-administered medications for this visit.          Social History   See HPI    Family History   No known family history of rheumatologic disorder    Physical Exam     Temp Readings from Last 3 Encounters:   07/31/19 97.4  F (36.3  C) (Oral)   10/06/14 97.3  F (36.3  C) (Oral)   10/01/14 97.8  F (36.6  C) (Oral)     BP Readings from Last 5 Encounters:   02/05/20 128/80   01/30/20 115/68   07/31/19 109/69   10/06/14 127/75   10/01/14 120/84     Pulse Readings from Last 1 Encounters:   02/05/20 76     Resp Readings from Last 1 Encounters:   No data found for Resp     Estimated body mass index is 35.03 kg/m  as calculated from the following:    Height as of 1/30/20: 1.674 m (5' 5.9\").    Weight as of 2/5/20: 98.2 kg (216 " lb 6.4 oz).    Telephone visit    Labs / Imaging (select studies)   RF/CCP  Recent Labs   Lab Test 01/30/20  0950   CCPIGG 1   RHF <20     CBC  Recent Labs   Lab Test 01/30/20  0950   WBC 6.5   RBC 5.00   HGB 14.3   HCT 43.6   MCV 87   RDW 13.1      MCH 28.6   MCHC 32.8   NEUTROPHIL 40.6   LYMPH 48.7   MONOCYTE 7.7   EOSINOPHIL 2.5   BASOPHIL 0.5   ANEU 2.6   ALYM 3.2   MASTER 0.5   AEOS 0.2   ABAS 0.0     CMP  Recent Labs   Lab Test 01/30/20  0950 10/06/14  1009   NA  --  136   POTASSIUM  --  4.1   CHLORIDE  --  103   CO2  --  26   ANIONGAP  --  7   GLC  --  80   BUN  --  10   CR 0.90 0.95   GFRESTIMATED >90 >90  Non African American GFR Calc     GFRESTBLACK >90 >90  African American GFR Calc     KALEY  --  9.6   BILITOTAL 0.5 0.3   ALBUMIN 3.9 4.1   PROTTOTAL 7.5 8.0   ALKPHOS 64 75   AST 22 23   ALT 37 40     Calcium/VitaminD  Recent Labs   Lab Test 01/30/20  0950 10/06/14  1009   KALEY  --  9.6   VITDT 10*  --      ESR/CRP  Recent Labs   Lab Test 01/30/20  0950   SED 5   CRP 3.7     Hepatitis B  Recent Labs   Lab Test 01/30/20  0950   HBCAB Nonreactive   HEPBANG Nonreactive     Hepatitis C  Recent Labs   Lab Test 01/30/20  0950   HCVAB Nonreactive     Lyme ab screening  Recent Labs   Lab Test 01/30/20  0950   LYMEGM 0.19     Recent Results (from the past 744 hour(s))   XR Sacroiliac Joint 1/2 Views    Narrative    SACROILIAC JOINT ONE TO TWO VIEWS   1/30/2020 10:36 AM     HISTORY:  Sacroiliitis question. Multiple joint pain.      Impression    IMPRESSION: I suspect mild right sacroiliitis versus degenerative  change.    MARIAN MARSHALL MD   XR Chest 2 Views    Narrative    XR CHEST 2 VW   1/30/2020 10:36 AM    INDICATION: Multiple joint pain.    COMPARISON: No pertinent comparison study is available for review.    FINDINGS:   No focal pulmonary infiltrate, pleural effusion, or pneumothorax. The  cardiac size and mediastinal contours appear within normal limits.     No acute fracture or dislocation. No discrete  osseous lesion or  radiopaque foreign body.     CONCLUSION:  No acute abnormality.    ELIZABETH KAT MD   XR Foot Bilateral G/E 3 Views    Narrative    FOOT BILATERAL THREE OR MORE VIEWS   1/30/2020 11:29 AM     HISTORY:  Polyarticular joint pain; evaluating for inflammatory  arthritis or other etiology to explain symptoms. Multiple joint pain.      Impression    IMPRESSION: Unremarkable exam.    MARIAN MARSHALL MD   XR Hand Bilateral G/E 3 Views    Narrative    HAND BILATERAL THREE OR MORE VIEWS 1/30/2020 11:29 AM     HISTORY: Polyarticular joint pain. Evaluating for inflammatory  arthritis or other etiology to explain symptoms. Multiple joint pain.      Impression    IMPRESSION:   1. Left: Positive ulnar variance. 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  2. Right: Unremarkable.    MARIAN MARSHALL MD         Immunization History     There is no immunization history on file for this patient.       Chart documentation done in part with Dragon Voice recognition Software. Although reviewed after completion, some word and grammatical error may remain.    Phone call start time: 3:46 PM  Phone call end time: 4:00 PM    This visit is equivalent to a [id number] visit    Location of patient: home  Location of provider: home    Follow up:  follow up appointment scheduled to be in August    Miguel Huerta MD  5/6/2020

## 2020-09-10 ENCOUNTER — MYC MEDICAL ADVICE (OUTPATIENT)
Dept: RHEUMATOLOGY | Facility: CLINIC | Age: 40
End: 2020-09-10

## 2020-09-25 ENCOUNTER — VIRTUAL VISIT (OUTPATIENT)
Dept: RHEUMATOLOGY | Facility: CLINIC | Age: 40
End: 2020-09-25
Payer: COMMERCIAL

## 2020-09-25 DIAGNOSIS — G89.29 CHRONIC LEFT SHOULDER PAIN: ICD-10-CM

## 2020-09-25 DIAGNOSIS — M54.41 MIDLINE LOW BACK PAIN WITH RIGHT-SIDED SCIATICA, UNSPECIFIED CHRONICITY: ICD-10-CM

## 2020-09-25 DIAGNOSIS — M47.819 SPONDYLOARTHROPATHY: Primary | ICD-10-CM

## 2020-09-25 DIAGNOSIS — Z79.899 HIGH RISK MEDICATION USE: ICD-10-CM

## 2020-09-25 DIAGNOSIS — E55.9 VITAMIN D DEFICIENCY: ICD-10-CM

## 2020-09-25 DIAGNOSIS — M25.512 CHRONIC LEFT SHOULDER PAIN: ICD-10-CM

## 2020-09-25 DIAGNOSIS — M46.1 SACROILIITIS (H): ICD-10-CM

## 2020-09-25 PROCEDURE — 99214 OFFICE O/P EST MOD 30 MIN: CPT | Mod: 95 | Performed by: INTERNAL MEDICINE

## 2020-09-25 RX ORDER — FAMOTIDINE 20 MG
1000 TABLET ORAL DAILY
Qty: 90 CAPSULE | Refills: 1 | Status: SHIPPED | OUTPATIENT
Start: 2020-09-25 | End: 2022-05-05

## 2020-09-25 NOTE — PROGRESS NOTES
"Maverick Richards is a 39 year old male who is being evaluated via a billable telephone visit.      The patient has been notified of following:     \"This telephone visit will be conducted via a call between you and your physician/provider. We have found that certain health care needs can be provided without the need for a physical exam.  This service lets us provide the care you need with a short phone conversation.  If a prescription is necessary we can send it directly to your pharmacy.  If lab work is needed we can place an order for that and you can then stop by our lab to have the test done at a later time.    Telephone visits are billed at different rates depending on your insurance coverage. During this emergency period, for some insurers they may be billed the same as an in-person visit.  Please reach out to your insurance provider with any questions.    If during the course of the call the physician/provider feels a telephone visit is not appropriate, you will not be charged for this service.\"    Patient has given verbal consent for Telephone visit?  Yes    What phone number would you like to be contacted at? [phone]    How would you like to obtain your AVS? [facility]    Rheumatology Telephone/TeleHelath Visit      Maverick Richards MRN# [id number]   YOB: 1980 Age: 40 year old      Date of visit: 9/25/20   PCP: Balbina Alva    Chief Complaint   Patient presents with:  Spondyloarthropathy    Assessment and Plan     1.  Spondyloarthropathy, axial and peripheral involvement: R>L MCP, PIP, DIP joint involvement that is inflammatory in nature.  Ankle and knee pain is mechanical in nature.  Axial symptoms had mixed inflammatory and degenerative symptoms; right sacroiliitis suspected based on x-rays.  Additionally, he has a history of bright red blood and black stools 1-2 times per month for the past 2 years and this in the setting of inflammatory arthritis raises the concern for an inflammatory " bowel disease so he has been previously referred to gastroenterology and he is planning to have a colonoscopy post-COVID19.  Hasn't started Humira yet; but inflammatory joint symptoms continued so he says that he is now arranging for Humira shipment with plans to start soon.  - Start Humira 40mg SQ every 14 days   - Labs in 3 months: CBC, Creatinine, Hepatic Panel, ESR, CRP    2.  Intermittent bright red blood and black stools, reportedly 1-2 times per month for a couple years: Seen by Dr. Alban Alcantara at [facility]; planning for colonoscopy post-COVID19    3.  Left shoulder pain: Consistent with impingement syndrome.  I directed him to a handout of physical therapy exercises available online from the American Academy of orthopedic surgery.  I anticipate that this may improve with arthritis treatment as noted in #1.  Advised that if he like to go to formal physical therapy we may also do this but he prefers to avoid extra clinic visits due to COVID-19.    4.  Lower back pain with right-sided sciatica: Occurred a couple times last seen.  Advised physical therapy exercises that he plans to do at home instead to formal physical therapy to reduce risk for COVID-19 exposure.  I directed him to the handouts available from the American Academy of orthopedic surgery.    3. Vitamin D deficiency: Was on erogocalciferol 50,000 units once weekly and never had repeat labs for vitamin D.  Change to vitamin D 1000 IU daily  - Labs in 3 mo: vitamin D    4.  Vaccinations: Vaccinations reviewed with Mr. Richards.  Risks and benefits of vaccinations were discussed.   CDC stance on shingrix when on moderate to high immunosuppression was reviewed.   I explained that Shingrix is used off label when under 50 years old and that the safety and efficacy of the vaccine has not been tested in people younger than 50 years old.   - Influenza: encouraged yearly vaccination  - [id number]: Advised receiving  - [id number]: Advised to receive at least 8  weeks after [id number] is administered  - Shingrix: Advised receiving; patient is going to check on insurance coverage before determining if it is going to be received    # Relevant labs and imaging were reviewed with the patient    # High risk medication toxicity monitoring: discussion and labs reviewed; appropriate labs ordered. See above.  Instructed that if confirmed to have COVID-19 or exposure to someone with confirmed COVID-19 to call this clinic for directions on DMARD management.    # Note that this is a virtual visit to reduce the risk of COVID-19 exposure during this current pandemic.      # Considered to be at high risk of complications from the COVID-19 virus.  It is recommended to limit contact with other people and if possible to work remotely or provide a leave of absence to reduce the risk for COVID-19.      Mr. Richards verbalized agreement with and understanding of the rational for the diagnosis and treatment plan.  All questions were answered to best of my ability and the patient's satisfaction. Mr. Richards was advised to contact the clinic with any questions that may arise after the clinic visit.      Thank you for involving me in the care of the patient    Return to clinic: 3 months      HPI     Maverick Richards is a 40 year old male who presents for follow-up of spondyloarthropathy    1/30/2020: Mr. Richards reported 5 years of right wrist pain, worse with certain movements that will cause pain for 1-7days that gradually improves and then another thing will trigger it again; was a period of a doing okay with a couple months then he openned the refrigerator door and it happened again; doesn't swell; maybe increased warmth; sharp shooting pain; pain will radiate to the dorsal right 2nd-4th fingers and sometimes to the volar forearm just proximal to the right wrist.  L>R shoulder pain with certain positions and he demonstrates pain with abduction. R>L MCPs>PIPs>DIPs hurt; more pain in the AM,  repetitive fine motor movement at work that worsens his pain; when doing heavy lifting prior to this job he had similar symptoms and would need to adjust how he lifted things. Elbows hurt sometimes. Hips are okay.  Knees hurt at the end of the day; improve with rest; no swelling.  Ankles are worse with activity; improve with rest; long history of spraining ankles over and over again; says that he has been eval'd several times including orthopedics and has been told to rest, splint sometimes with ACE bandages, and ice.  Toes are okay.  Morning stiffness for 1 hour, better with a hot shower; +gelling.  He then says that while he is active during the day he feels so much better, and if he sits down to rest then he feels terrible because it is so hard to get going again.  He says that when he gets home he has to get everything done right away because if he sits to relax then it is hard to get up again.     5/6/2020: no change in symptoms.  Didn't start Humira because of anticipation that it may change the colonoscopy results; wasn't concerned about COVID19 related issues.  Still with pain and stiffness and plans to start Humira for these symptoms as he doesn't know when the colonoscopy will be able to be done.     Today, 9/25/2020: Still with bloody stools intermittently but not for the last few weeks.  Pain at his MCPs more than the PIPs but also involving the DIPs that is worse in the morning and improves with time and activity.  Too much fine motor movement also bothers his hands.  Morning stiffness for at least 1 hour but is better with a hot shower.  Positive gelling phenomenon.  Lower back stiffness and pain is worse in the morning and improves with time and activity.  Had a couple times of sharp lower back pain that radiated to his right leg and he went to a chiropractor but does not want to keep going to a chiropractor he says.  Continues on high-dose vitamin D but is now out.  Left shoulder pain with abduction  above 90 degrees; range of motion intact per patient.      Denies fevers, chills, nausea, vomiting, constipation, diarrhea. No abdominal pain.  He notes having intermittent bright red blood and black stools, usually about 1-2 times per month for couple years now; see above.  No chest pain/pressure, palpitations, or shortness of breath. No LE swelling. No neck pain. No oral or nasal sores.  No rash. No sicca symptoms. No photosensitivity or photophobia. No eye pain or redness. No history of inflammatory eye disease.  No history of DVT or pulmonary embolism.  No history of serositis.  Sometimes mild distal white color change with cold exposure but no other color change and no associated pain.  No known neurologic disorder.  No known renal disorder.      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 No oral or nasal ulcers.  CV: No chest pain, pressure, palpitations, or dyspnea on exertion.  PULM: No SOB, wheeze, cough.  GI: See HPI  : No blood in urine.  MSK: See HPI.  NEURO: No numbness or tingling  ENDO: No heat/cold intolerance.  EXT: No LE swelling  PSYCH: Negative    Active Problem List     Patient Active Problem List   Diagnosis     CARDIOVASCULAR SCREENING; LDL GOAL LESS THAN 160     Past Medical History   History reviewed. No pertinent past medical history.  Past Surgical History     Past Surgical History:   Procedure Laterality Date     ENT SURGERY      tonsilectomy age 1     Allergy   No Known Allergies  Current Medication List     Current Outpatient Medications   Medication Sig     medical cannabis (Patient's own supply) See Admin Instructions (The purpose of this order is to document that the patient reports taking medical cannabis.  This is not a prescription, and is not used to certify that the patient has a  "qualifying medical condition.)     adalimumab (HUMIRA *CF*) 40 MG/0.4ML pen kit Inject 0.4 mLs (40 mg) Subcutaneous every 14 days . Hold for signs of infection, then seek medical attention. (Patient not taking: Reported on 5/6/2020)     methocarbamol (ROBAXIN) 750 MG tablet Take 1 tablet (750 mg) by mouth 4 times daily as needed for muscle spasms (Patient not taking: Reported on 1/30/2020)     No current facility-administered medications for this visit.          Social History   See HPI    Family History   No known family history of rheumatologic disorder    Physical Exam     Temp Readings from Last 3 Encounters:   07/31/19 97.4  F (36.3  C) (Oral)   10/06/14 97.3  F (36.3  C) (Oral)   10/01/14 97.8  F (36.6  C) (Oral)     BP Readings from Last 5 Encounters:   02/05/20 128/80   01/30/20 115/68   07/31/19 109/69   10/06/14 127/75   10/01/14 120/84     Pulse Readings from Last 1 Encounters:   02/05/20 76     Resp Readings from Last 1 Encounters:   No data found for Resp     Estimated body mass index is 35.03 kg/m  as calculated from the following:    Height as of 1/30/20: 1.674 m (5' 5.9\").    Weight as of 2/5/20: 98.2 kg (216 lb 6.4 oz).    Telephone visit    Labs / Imaging (select studies)   RF/CCP  Recent Labs   Lab Test 01/30/20  0950   CCPIGG 1   RHF <20     CBC  Recent Labs   Lab Test 01/30/20  0950   WBC 6.5   RBC 5.00   HGB 14.3   HCT 43.6   MCV 87   RDW 13.1      MCH 28.6   MCHC 32.8   NEUTROPHIL 40.6   LYMPH 48.7   MONOCYTE 7.7   EOSINOPHIL 2.5   BASOPHIL 0.5   ANEU 2.6   ALYM 3.2   MASTER 0.5   AEOS 0.2   ABAS 0.0     CMP  Recent Labs   Lab Test 01/30/20  0950 10/06/14  1009   NA  --  136   POTASSIUM  --  4.1   CHLORIDE  --  103   CO2  --  26   ANIONGAP  --  7   GLC  --  80   BUN  --  10   CR 0.90 0.95   GFRESTIMATED >90 >90  Non African American GFR Calc     GFRESTBLACK >90 >90  African American GFR Calc     KALEY  --  9.6   BILITOTAL 0.5 0.3   ALBUMIN 3.9 4.1   PROTTOTAL 7.5 8.0   ALKPHOS 64 75   AST " 22 23   ALT 37 40     Calcium/VitaminD  Recent Labs   Lab Test 01/30/20  0950 10/06/14  1009   KALEY  --  9.6   VITDT 10*  --      ESR/CRP  Recent Labs   Lab Test 01/30/20  0950   SED 5   CRP 3.7     Hepatitis B  Recent Labs   Lab Test 01/30/20  0950   HBCAB Nonreactive   HEPBANG Nonreactive     Hepatitis C  Recent Labs   Lab Test 01/30/20  0950   HCVAB Nonreactive     Lyme ab screening  Recent Labs   Lab Test 01/30/20  0950   LYMEGM 0.19     Recent Results (from the past 744 hour(s))   XR Sacroiliac Joint 1/2 Views    Narrative    SACROILIAC JOINT ONE TO TWO VIEWS   1/30/2020 10:36 AM     HISTORY:  Sacroiliitis question. Multiple joint pain.      Impression    IMPRESSION: I suspect mild right sacroiliitis versus degenerative  change.    MARIAN MARSHALL MD   XR Chest 2 Views    Narrative    XR CHEST 2 VW   1/30/2020 10:36 AM    INDICATION: Multiple joint pain.    COMPARISON: No pertinent comparison study is available for review.    FINDINGS:   No focal pulmonary infiltrate, pleural effusion, or pneumothorax. The  cardiac size and mediastinal contours appear within normal limits.     No acute fracture or dislocation. No discrete osseous lesion or  radiopaque foreign body.     CONCLUSION:  No acute abnormality.    ELIZABETH KAT MD   XR Foot Bilateral G/E 3 Views    Narrative    FOOT BILATERAL THREE OR MORE VIEWS   1/30/2020 11:29 AM     HISTORY:  Polyarticular joint pain; evaluating for inflammatory  arthritis or other etiology to explain symptoms. Multiple joint pain.      Impression    IMPRESSION: Unremarkable exam.    MARIAN MARSHALL MD   XR Hand Bilateral G/E 3 Views    Narrative    HAND BILATERAL THREE OR MORE VIEWS 1/30/2020 11:29 AM     HISTORY: Polyarticular joint pain. Evaluating for inflammatory  arthritis or other etiology to explain symptoms. Multiple joint pain.      Impression    IMPRESSION:   1. Left: Positive ulnar variance. 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  2. Right: Unremarkable.    MARIAN MARSHALL MD          Immunization History     There is no immunization history on file for this patient.       Chart documentation done in part with Dragon Voice recognition Software. Although reviewed after completion, some word and grammatical error may remain.    Phone call start time: 8:45 AM  Phone call end time: 9:00 AM    This visit is equivalent to a 96917 visit    Location of patient: Work, in [place]  Location of provider: home    Follow up:  follow up appointment scheduled to be in January    Miguel Huerta MD

## 2020-11-22 ENCOUNTER — HEALTH MAINTENANCE LETTER (OUTPATIENT)
Age: 40
End: 2020-11-22

## 2021-01-25 DIAGNOSIS — M47.819 SPONDYLOARTHROPATHY: ICD-10-CM

## 2021-01-25 DIAGNOSIS — M46.1 SACROILIITIS (H): ICD-10-CM

## 2021-01-25 NOTE — TELEPHONE ENCOUNTER
Called patient who stated he picked up all of the refills of his Humira prescription. RN advised patient that he is due for labs and scheduled him an apt on 2/10/21. Patient will call to schedule a follow up apt after viewing his schedule. Forwarded to Dr. Huerta to advise on refill.    Pk Estrada RN....1/25/2021 4:11 PM

## 2021-01-27 NOTE — TELEPHONE ENCOUNTER
Rheumatology team: Please call to notify Mr. Richards that humira has been refilled.  Miguel Huerta MD  1/26/2021 11:22 PM

## 2021-01-28 NOTE — TELEPHONE ENCOUNTER
[employer] message sent to patient.  Jade Laureano CMA Rheumatology  1/28/2021 9:27 AM

## 2021-02-10 DIAGNOSIS — M47.819 SPONDYLOARTHROPATHY: ICD-10-CM

## 2021-02-10 DIAGNOSIS — E55.9 VITAMIN D DEFICIENCY: ICD-10-CM

## 2021-02-10 DIAGNOSIS — Z79.899 HIGH RISK MEDICATION USE: ICD-10-CM

## 2021-02-10 LAB
BASOPHILS # BLD AUTO: 0 10E9/L (ref 0–0.2)
BASOPHILS NFR BLD AUTO: 0.5 %
DIFFERENTIAL METHOD BLD: NORMAL
EOSINOPHIL # BLD AUTO: 0.2 10E9/L (ref 0–0.7)
EOSINOPHIL NFR BLD AUTO: 2.7 %
ERYTHROCYTE [DISTWIDTH] IN BLOOD BY AUTOMATED COUNT: 13.6 % (ref 10–15)
ERYTHROCYTE [SEDIMENTATION RATE] IN BLOOD BY WESTERGREN METHOD: 5 MM/H (ref 0–15)
HCT VFR BLD AUTO: 44.3 % (ref 40–53)
HGB BLD-MCNC: 14.2 G/DL (ref 13.3–17.7)
LYMPHOCYTES # BLD AUTO: 3.8 10E9/L (ref 0.8–5.3)
LYMPHOCYTES NFR BLD AUTO: 48.7 %
MCH RBC QN AUTO: 28.7 PG (ref 26.5–33)
MCHC RBC AUTO-ENTMCNC: 32.1 G/DL (ref 31.5–36.5)
MCV RBC AUTO: 90 FL (ref 78–100)
MONOCYTES # BLD AUTO: 0.6 10E9/L (ref 0–1.3)
MONOCYTES NFR BLD AUTO: 8 %
NEUTROPHILS # BLD AUTO: 3.1 10E9/L (ref 1.6–8.3)
NEUTROPHILS NFR BLD AUTO: 40.1 %
PLATELET # BLD AUTO: 296 10E9/L (ref 150–450)
RBC # BLD AUTO: 4.95 10E12/L (ref 4.4–5.9)
WBC # BLD AUTO: 7.7 10E9/L (ref 4–11)

## 2021-02-10 PROCEDURE — 36415 COLL VENOUS BLD VENIPUNCTURE: CPT | Performed by: INTERNAL MEDICINE

## 2021-02-10 PROCEDURE — 85652 RBC SED RATE AUTOMATED: CPT | Performed by: INTERNAL MEDICINE

## 2021-02-10 PROCEDURE — 82306 VITAMIN D 25 HYDROXY: CPT | Performed by: INTERNAL MEDICINE

## 2021-02-10 PROCEDURE — 80076 HEPATIC FUNCTION PANEL: CPT | Performed by: INTERNAL MEDICINE

## 2021-02-10 PROCEDURE — 86140 C-REACTIVE PROTEIN: CPT | Performed by: INTERNAL MEDICINE

## 2021-02-10 PROCEDURE — 82565 ASSAY OF CREATININE: CPT | Performed by: INTERNAL MEDICINE

## 2021-02-10 PROCEDURE — 85025 COMPLETE CBC W/AUTO DIFF WBC: CPT | Performed by: INTERNAL MEDICINE

## 2021-02-11 LAB
ALBUMIN SERPL-MCNC: 4.1 G/DL (ref 3.4–5)
ALP SERPL-CCNC: 70 U/L (ref 40–150)
ALT SERPL W P-5'-P-CCNC: 38 U/L (ref 0–70)
AST SERPL W P-5'-P-CCNC: 26 U/L (ref 0–45)
BILIRUB DIRECT SERPL-MCNC: <0.1 MG/DL (ref 0–0.2)
BILIRUB SERPL-MCNC: 0.2 MG/DL (ref 0.2–1.3)
CREAT SERPL-MCNC: 0.95 MG/DL (ref 0.66–1.25)
CRP SERPL-MCNC: <2.9 MG/L (ref 0–8)
DEPRECATED CALCIDIOL+CALCIFEROL SERPL-MC: 21 UG/L (ref 20–75)
GFR SERPL CREATININE-BSD FRML MDRD: >90 ML/MIN/{1.73_M2}
PROT SERPL-MCNC: 7.3 G/DL (ref 6.8–8.8)

## 2021-03-17 ENCOUNTER — IMMUNIZATION (OUTPATIENT)
Dept: NURSING | Facility: CLINIC | Age: 41
End: 2021-03-17
Payer: COMMERCIAL

## 2021-03-17 PROCEDURE — 0001A PR COVID VAC PFIZER DIL RECON 30 MCG/0.3 ML IM: CPT

## 2021-03-17 PROCEDURE — 91300 PR COVID VAC PFIZER DIL RECON 30 MCG/0.3 ML IM: CPT

## 2021-03-23 DIAGNOSIS — M47.819 SPONDYLOARTHROPATHY: ICD-10-CM

## 2021-03-23 DIAGNOSIS — M46.1 SACROILIITIS (H): ICD-10-CM

## 2021-03-24 NOTE — TELEPHONE ENCOUNTER
Rheumatology team: Please call to notify Mr. Richards that Humira has been refilled.  Rheumatology follow-up required for next refill.  Miguel Huerta MD  3/24/2021 9:37 AM

## 2021-04-07 ENCOUNTER — IMMUNIZATION (OUTPATIENT)
Dept: NURSING | Facility: CLINIC | Age: 41
End: 2021-04-07
Attending: FAMILY MEDICINE
Payer: COMMERCIAL

## 2021-04-07 PROCEDURE — 0002A PR COVID VAC PFIZER DIL RECON 30 MCG/0.3 ML IM: CPT

## 2021-04-07 PROCEDURE — 91300 PR COVID VAC PFIZER DIL RECON 30 MCG/0.3 ML IM: CPT

## 2021-04-10 ENCOUNTER — HEALTH MAINTENANCE LETTER (OUTPATIENT)
Age: 41
End: 2021-04-10

## 2021-04-21 ENCOUNTER — OFFICE VISIT (OUTPATIENT)
Dept: FAMILY MEDICINE | Facility: CLINIC | Age: 41
End: 2021-04-21
Payer: COMMERCIAL

## 2021-04-21 VITALS
BODY MASS INDEX: 31.98 KG/M2 | DIASTOLIC BLOOD PRESSURE: 73 MMHG | HEART RATE: 72 BPM | TEMPERATURE: 98.3 F | SYSTOLIC BLOOD PRESSURE: 112 MMHG | HEIGHT: 66 IN | WEIGHT: 199 LBS

## 2021-04-21 DIAGNOSIS — Z00.00 ROUTINE GENERAL MEDICAL EXAMINATION AT A HEALTH CARE FACILITY: Primary | ICD-10-CM

## 2021-04-21 DIAGNOSIS — Z13.220 SCREENING FOR HYPERLIPIDEMIA: ICD-10-CM

## 2021-04-21 DIAGNOSIS — Z11.4 SCREENING FOR HIV (HUMAN IMMUNODEFICIENCY VIRUS): ICD-10-CM

## 2021-04-21 DIAGNOSIS — L98.9 SKIN LESION: ICD-10-CM

## 2021-04-21 DIAGNOSIS — M47.819 SPONDYLOARTHROPATHY: ICD-10-CM

## 2021-04-21 PROCEDURE — 99396 PREV VISIT EST AGE 40-64: CPT | Performed by: PHYSICIAN ASSISTANT

## 2021-04-21 ASSESSMENT — ENCOUNTER SYMPTOMS
HEARTBURN: 0
DYSURIA: 0
NERVOUS/ANXIOUS: 0
EYE PAIN: 0
FEVER: 0
NAUSEA: 0
SHORTNESS OF BREATH: 0
WEAKNESS: 0
PALPITATIONS: 0
DIZZINESS: 0
PARESTHESIAS: 0
FREQUENCY: 0
SORE THROAT: 0
COUGH: 0
ABDOMINAL PAIN: 0
ARTHRALGIAS: 0
DIARRHEA: 0
MYALGIAS: 0
HEMATURIA: 0
HEMATOCHEZIA: 0
HEADACHES: 0
JOINT SWELLING: 0
CHILLS: 0
CONSTIPATION: 0

## 2021-04-21 ASSESSMENT — MIFFLIN-ST. JEOR: SCORE: 1742.47

## 2021-04-21 NOTE — PROGRESS NOTES
SUBJECTIVE:   CC: Maverick Richards is an 40 year old male who presents for preventative health visit.       Patient has been advised of split billing requirements and indicates understanding: Yes  Healthy Habits:     Getting at least 3 servings of Calcium per day:  Yes    Bi-annual eye exam:  NO    Dental care twice a year:  NO    Sleep apnea or symptoms of sleep apnea:  Excessive snoring    Diet:  Regular (no restrictions)    Frequency of exercise:  1 day/week    Duration of exercise:  Less than 15 minutes    Taking medications regularly:  Yes    Medication side effects:  None    PHQ-2 Total Score: 2    Additional concerns today:  Yes         Breasts pain,check spot on R arm and  sore on L leg   Has breast pain x 3 years-on both sides -if he bumps into something painful  Spt on arm x several years-no growing but gets a scab that is not healing      Today's PHQ-2 Score:   PHQ-2 ( 1999 Pfizer) 4/21/2021   Q1: Little interest or pleasure in doing things 1   Q2: Feeling down, depressed or hopeless 1   PHQ-2 Score 2   Q1: Little interest or pleasure in doing things Several days   Q2: Feeling down, depressed or hopeless Several days   PHQ-2 Score 2       Abuse: Current or Past(Physical, Sexual or Emotional)- No  Do you feel safe in your environment? Yes    Have you ever done Advance Care Planning? (For example, a Health Directive, POLST, or a discussion with a medical provider or your loved ones about your wishes): No, advance care planning information given to patient to review.  Patient plans to discuss their wishes with loved ones or provider.      Social History     Tobacco Use     Smoking status: Former Smoker     Smokeless tobacco: Never Used   Substance Use Topics     Alcohol use: Not Currently     Comment: socially 6 a week     If you drink alcohol do you typically have >3 drinks per day or >7 drinks per week? No    Alcohol Use 4/21/2021   Prescreen: >3 drinks/day or >7 drinks/week? Not Applicable   Prescreen: >3  "drinks/day or >7 drinks/week? -   No flowsheet data found.    Last PSA: No results found for: PSA    Reviewed orders with patient. Reviewed health maintenance and updated orders accordingly - Yes  Labs reviewed in EPIC    Reviewed and updated as needed this visit by clinical staff  Tobacco  Allergies  Meds   Med Hx  Surg Hx  Fam Hx  Soc Hx        Reviewed and updated as needed this visit by Provider   Allergies  Meds                 Review of Systems   Constitutional: Negative for chills and fever.   HENT: Positive for congestion. Negative for ear pain, hearing loss and sore throat.    Eyes: Negative for pain and visual disturbance.   Respiratory: Negative for cough and shortness of breath.    Cardiovascular: Negative for chest pain, palpitations and peripheral edema.   Gastrointestinal: Negative for abdominal pain, constipation, diarrhea, heartburn, hematochezia and nausea.   Genitourinary: Positive for genital sores. Negative for discharge, dysuria, frequency, hematuria, impotence and urgency.   Musculoskeletal: Negative for arthralgias, joint swelling and myalgias.   Skin: Negative for rash.   Neurological: Negative for dizziness, weakness, headaches and paresthesias.   Psychiatric/Behavioral: Negative for mood changes. The patient is not nervous/anxious.          OBJECTIVE:   /73   Pulse 72   Temp 98.3  F (36.8  C) (Oral)   Ht 1.664 m (5' 5.5\")   Wt 90.3 kg (199 lb)   BMI 32.61 kg/m      Physical Exam  Constitutional:       Appearance: He is well-developed. He is obese.   HENT:      Right Ear: Tympanic membrane, ear canal and external ear normal.      Left Ear: Tympanic membrane, ear canal and external ear normal.      Mouth/Throat:      Pharynx: No oropharyngeal exudate.   Eyes:      Pupils: Pupils are equal, round, and reactive to light.   Neck:      Thyroid: No thyromegaly.   Cardiovascular:      Rate and Rhythm: Normal rate and regular rhythm.      Heart sounds: Normal heart sounds. " "  Pulmonary:      Effort: Pulmonary effort is normal.      Breath sounds: Normal breath sounds.   Chest:      Chest wall: No mass or swelling.      Breasts:         Right: Normal.         Left: Normal.   Abdominal:      General: Bowel sounds are normal.      Palpations: Abdomen is soft.      Tenderness: There is no abdominal tenderness.   Genitourinary:     Comments: Normal testicles, no hernias or masses   Lymphadenopathy:      Cervical: No cervical adenopathy.   Skin:     General: Skin is warm and dry.      Comments: Small flesh colored papule on right wrist  Cyst on left upper thigh    Neurological:      Mental Status: He is alert and oriented to person, place, and time.      Deep Tendon Reflexes: Reflexes normal.   Psychiatric:         Behavior: Behavior normal.                 Diagnostic Test Results:  Labs reviewed in Epic    ASSESSMENT/PLAN:   1. Routine general medical examination at a health care facility  Referral to urology for possible vasectomy and referral to derm for non healing skin lesion   Reassured about breast pain.  No massed or lumps noted.    - DERMATOLOGY ADULT REFERRAL; Future  - UROLOGY ADULT REFERRAL; Future    2. Screening for HIV (human immunodeficiency virus)    - HIV Antigen Antibody Combo; Future    3. Screening for hyperlipidemia    - Lipid panel reflex to direct LDL Fasting; Future    4. Skin lesion  Given lesion on wrist is not healing see derm   - DERMATOLOGY ADULT REFERRAL; Future    5. Spondyloarthropathy  Sees rheum       Patient has been advised of split billing requirements and indicates understanding: Yes  COUNSELING:   Reviewed preventive health counseling, as reflected in patient instructions       Regular exercise       Healthy diet/nutrition       Family planning    Estimated body mass index is 32.61 kg/m  as calculated from the following:    Height as of this encounter: 1.664 m (5' 5.5\").    Weight as of this encounter: 90.3 kg (199 lb).     Weight management plan: " Discussed healthy diet and exercise guidelines    He reports that he has quit smoking. He has never used smokeless tobacco.      Counseling Resources:  ATP IV Guidelines  Pooled Cohorts Equation Calculator  FRAX Risk Assessment  ICSI Preventive Guidelines  Dietary Guidelines for Americans, 2010  USDA's MyPlate  ASA Prophylaxis  Lung CA Screening    EMELY Goetz [facility]

## 2021-04-21 NOTE — PROGRESS NOTES
"  3  SUBJECTIVE:   CC: Maverick Richards is an 40 year old male who presents for preventive health visit.     {Split Bill scripting  The purpose of this visit is to discuss your medical history and prevent health problems before you are sick. You may be responsible for a co-pay, coinsurance, or deductible if your visit today includes services such as checking on a sore throat, having an x-ray or lab test, or treating and evaluating a new or existing condition :196972}  Patient has been advised of split billing requirements and indicates understanding: {Yes and No:876646}  Healthy Habits:    Do you get at least three servings of calcium containing foods daily (dairy, green leafy vegetables, etc.)? { :434739::\"yes\"}    Amount of exercise or daily activities, outside of work: { :109981}    Problems taking medications regularly { :390406::\"No\"}    Medication side effects: { :433925::\"No\"}    Have you had an eye exam in the past two years? { :326475}    Do you see a dentist twice per year? { :081111}    Do you have sleep apnea, excessive snoring or daytime drowsiness?{ :934269}  {Outside tests to abstract? :444735}    {additional problems to add (Optional):585199}    Today's PHQ-2 Score:   PHQ-2 ( 1999 [employer]) 4/21/2021 7/31/2019   Q1: Little interest or pleasure in doing things 1 0   Q2: Feeling down, depressed or hopeless 1 0   PHQ-2 Score 2 0   Q1: Little interest or pleasure in doing things Several days -   Q2: Feeling down, depressed or hopeless Several days -   PHQ-2 Score 2 -     {PHQ-2 LOOK IN ASSESSMENTS (Optional) :893670}  Abuse: Current or Past(Physical, Sexual or Emotional)- {YES/NO/NA:987409}  Do you feel safe in your environment? {YES/NO/NA:652618}    Have you ever done Advance Care Planning? (For example, a Health Directive, POLST, or a discussion with a medical provider or your loved ones about your wishes): { :104978}    Social History     Tobacco Use     Smoking status: Former Smoker     Smokeless " "tobacco: Never Used   Substance Use Topics     Alcohol use: Not Currently     Comment: socially 6 a week     If you drink alcohol do you typically have >3 drinks per day or >7 drinks per week? {ETOH :051331}                      Last PSA: No results found for: PSA    Reviewed orders with patient. Reviewed health maintenance and updated orders accordingly - {Yes/No:767826::\"Yes\"}  {Chronicprobdata (Optional):430548}    Reviewed and updated as needed this visit by clinical staff                 Reviewed and updated as needed this visit by Provider                {HISTORY OPTIONS (Optional):808548}    ROS:  { :590971::\"CONSTITUTIONAL: NEGATIVE for fever, chills, change in weight\",\"INTEGUMENTARY/SKIN: NEGATIVE for worrisome rashes, moles or lesions\",\"EYES: NEGATIVE for vision changes or irritation\",\"ENT: NEGATIVE for ear, mouth and throat problems\",\"RESP: NEGATIVE for significant cough or SOB\",\"CV: NEGATIVE for chest pain, palpitations or peripheral edema\",\"GI: NEGATIVE for nausea, abdominal pain, heartburn, or change in bowel habits\",\" male: negative for dysuria, hematuria, decreased urinary stream, erectile dysfunction, urethral discharge\",\"MUSCULOSKELETAL: NEGATIVE for significant arthralgias or myalgia\",\"NEURO: NEGATIVE for weakness, dizziness or paresthesias\",\"PSYCHIATRIC: NEGATIVE for changes in mood or affect\"}    OBJECTIVE:   There were no vitals taken for this visit.  EXAM:  {Exam Choices:386105}    {Diagnostic Test Results (Optional):810890::\"Diagnostic Test Results:\",\"Labs reviewed in Epic\"}    ASSESSMENT/PLAN:   {Diag Picklist:203578}    Patient has been advised of split billing requirements and indicates understanding: {YES / NO:876478::\"Yes\"}  COUNSELING:  {MALE COUNSELING MESSAGES:591627::\"Reviewed preventive health counseling, as reflected in patient instructions\"}    Estimated body mass index is 35.03 kg/m  as calculated from the following:    Height as of 1/30/20: 1.674 m (5' 5.9\").    Weight as of " 2/5/20: 98.2 kg (216 lb 6.4 oz).    {Weight Management Plan (ACO) Complete if BMI is abnormal-  Ages 18-64  BMI >24.9.  Age 65+ with BMI <23 or >30 (Optional):343615}    He reports that he has quit smoking. He has never used smokeless tobacco.      Counseling Resources:  ATP IV Guidelines  Pooled Cohorts Equation Calculator  FRAX Risk Assessment  ICSI Preventive Guidelines  Dietary Guidelines for Americans, 2010  Social[employer]'s MyPlate  ASA Prophylaxis  Lung CA Screening    Balbina Alva PA-C  [facility] [place]  Answers for HPI/ROS submitted by the patient on 4/21/2021   Annual Exam:  Frequency of exercise:: 1 day/week  Getting at least 3 servings of Calcium per day:: Yes  Diet:: Regular (no restrictions)  Taking medications regularly:: Yes  Medication side effects:: None  Bi-annual eye exam:: NO  Dental care twice a year:: NO  Sleep apnea or symptoms of sleep apnea:: Excessive snoring  abdominal pain: No  Blood in stool: No  Blood in urine: No  chest pain: No  chills: No  congestion: Yes  constipation: No  cough: No  diarrhea: No  dizziness: No  ear pain: No  eye pain: No  nervous/anxious: No  fever: No  frequency: No  genital sores: Yes  headaches: No  hearing loss: No  heartburn: No  arthralgias: No  joint swelling: No  peripheral edema: No  mood changes: No  myalgias: No  nausea: No  dysuria: No  palpitations: No  Skin sensation changes: No  sore throat: No  urgency: No  rash: No  shortness of breath: No  visual disturbance: No  weakness: No  impotence: No  penile discharge: No  Additional concerns today:: Yes  Duration of exercise:: Less than 15 minutes

## 2021-04-23 ENCOUNTER — MYC MEDICAL ADVICE (OUTPATIENT)
Dept: FAMILY MEDICINE | Facility: CLINIC | Age: 41
End: 2021-04-23

## 2021-04-23 PROBLEM — M47.819 SPONDYLOARTHROPATHY: Status: ACTIVE | Noted: 2021-04-23

## 2021-05-25 DIAGNOSIS — M47.819 SPONDYLOARTHROPATHY: ICD-10-CM

## 2021-05-25 DIAGNOSIS — M46.1 SACROILIITIS (H): ICD-10-CM

## 2021-05-25 NOTE — TELEPHONE ENCOUNTER
Per Dr. Huerta, a rheumatology follow up is required prior to further refills. Rheumatology MA, please call and inform patient of this and assist with scheduling a follow up.    Pk WHEELER RN....5/25/2021 1:47 PM

## 2021-05-25 NOTE — TELEPHONE ENCOUNTER
[facility] Call Center    Phone Message    May a detailed message be left on voicemail: yes     Reason for Call: Medication Refill Request    Has the patient contacted the pharmacy for the refill? Yes   Name of medication being requested: Humira  Provider who prescribed the medication: Bradley  Pharmacy: [facility]   Date medication is needed: ASAP         Action Taken: Message routed to:  Other: KATIA Rheum    Travel Screening: Not Applicable

## 2021-06-01 NOTE — TELEPHONE ENCOUNTER
Melissa called back from  [facility], please send in rx for Humira Pens. Pt has been scheduled for an appt.

## 2021-06-01 NOTE — TELEPHONE ENCOUNTER
Rheumatology team: Please call to notify Mr. Rihcards that Humira has been refilled.    Miguel Huerta MD  6/1/2021 4:57 PM

## 2021-06-01 NOTE — TELEPHONE ENCOUNTER
Dr. Huerta,   Please see below and refill medication as appropriate.   Patient is scheduled for an appointment on Fri. 7/9.     Clarisa Marrero, [facility] RN Manager  [facility]

## 2021-06-04 ENCOUNTER — MYC MEDICAL ADVICE (OUTPATIENT)
Dept: RHEUMATOLOGY | Facility: CLINIC | Age: 41
End: 2021-06-04

## 2021-06-22 ENCOUNTER — OFFICE VISIT (OUTPATIENT)
Dept: DERMATOLOGY | Facility: CLINIC | Age: 41
End: 2021-06-22
Attending: PHYSICIAN ASSISTANT
Payer: COMMERCIAL

## 2021-06-22 DIAGNOSIS — L82.1 SEBORRHEIC KERATOSIS: ICD-10-CM

## 2021-06-22 DIAGNOSIS — L81.4 SOLAR LENTIGO: ICD-10-CM

## 2021-06-22 DIAGNOSIS — L57.8 SUN-DAMAGED SKIN: Primary | ICD-10-CM

## 2021-06-22 DIAGNOSIS — L28.1 PICKER'S NODULE: ICD-10-CM

## 2021-06-22 DIAGNOSIS — L21.9 DERMATITIS, SEBORRHEIC: ICD-10-CM

## 2021-06-22 DIAGNOSIS — D22.9 MULTIPLE BENIGN NEVI: ICD-10-CM

## 2021-06-22 PROCEDURE — 99204 OFFICE O/P NEW MOD 45 MIN: CPT | Performed by: DERMATOLOGY

## 2021-06-22 RX ORDER — TRIAMCINOLONE ACETONIDE 1 MG/G
CREAM TOPICAL
Qty: 160 G | Refills: 1 | Status: SHIPPED | OUTPATIENT
Start: 2021-06-22 | End: 2021-07-20

## 2021-06-22 RX ORDER — KETOCONAZOLE 20 MG/ML
SHAMPOO TOPICAL
Qty: 120 ML | Refills: 11 | Status: SHIPPED | OUTPATIENT
Start: 2021-06-22 | End: 2022-10-26

## 2021-06-22 ASSESSMENT — PAIN SCALES - GENERAL: PAINLEVEL: NO PAIN (0)

## 2021-06-22 NOTE — LETTER
6/22/2021         RE: Maverick Richards  [address] [place] [address]        Dear Colleague,    Thank you for referring your patient, Maverick Richards, to the [facility]. Please see a copy of my visit note below.    [facility] Dermatology Note  Encounter Date: Jun 22, 2021  Office Visit     Dermatology Problem List:  1. Relevant medical history: Spondyloarthropathy on humira  2. Seb derm, severe - TAC 0.1%, keto 2% shampoo    Family History: history of skin cancer in maternal grandfather, uncertain type.  Social History: works indoors in a [employer]. Plays golf and disk golf. Has two children.  ____________________________________________    ASSESSMENT/PLAN:    # Immunosuppressed with humira for spondyloarthropathy. Discussed increased risk of skin cancers with immunosuppressing medications.   - Recommend sunscreens SPF #30 or greater, protective clothing and avoidance of tanning beds.   - Recommended yearly skin exams.    # Sun damaged skin with solar lentigines. Chronic. Stable.   - Recommended sunscreens SPF #30 or greater, protective clothing and avoidance of tanning beds.     # Benign findings include: seborrheic keratoses. Self limited, minor.   - No further intervention required. Patient to report changes.  - Patient reassured of the benign nature of these lesions.    # Multiple clinically benign nevi. Chronic. Stable.   - No further intervention required. Patient to report changes.  - Patient reassured of the benign nature of these lesions.    # 's Nodule - right wrist. Chronic problem, flared with itch.  - Discussed the etiology of this diagnosis.  - Apply triamcinolone 0.1% ointment and cover with a waterproof bandage. Leave on until starts to peel off (typically 3-5 days).  - Discussed importance of avoiding picking at the location and breaking itch/scratch cycle.    # Rash, reticular on chest and back in V distribution: Initially I considered  "CARP, but patient notes that this rash gets better when he is on humira. Given seb derm elsewhere on body, I think this is the most likely diagnosis. Will treat for seb derm and re-evaluate in one month to ensure improved. If not improved, will biopsy.  - Start ketoconazole 2% shampoo for the scalp, face, chest, and back. Lather and let sit for 3 minutes then wash out. Alternate keto shampoo with on OTC anti dandruff shampoo.  - Start Triamcinolone 0.1% cream BID to the chest.    Procedures Performed:   None.    Follow-up: 4 weeks for seb derm, 1 year in-person for skin check, or earlier for new or changing lesions    Staff and Scribe:     Scribe Disclosure:   I, Brandi Medina, am serving as a scribe to document services personally performed by this physician, Dr. Charito Wade, based on data collection and the provider's statements to me.     Provider Disclosure:   The documentation recorded by the scribe accurately reflects the services I personally performed and the decisions made by me.    Charito Wade MD    Department of Dermatology  [facility]: Phone: [phone], Fax:[phone]  [facility]: Phone: [phone], Fax: [phone]    ____________________________________________    CC: Skin Check (Total skin check. Areas of concern-right forearm, shoulders and chest. On Humira-)    HPI:  Mr. Maverick Richards is a(n) 41 year old male who presents today as a new patient for skin check.    He is new to our department. He has not seen a dermatologist prior. He has no history of skin cancer, atypical moles, or precancerous lesions to his knowledge.    Referred by PCP for skin lesion on 4/21/21. Note from 4/21/21 states there is a spot on arm x several years-not growing but gets a scab that is not healing. Exam describes \"small flesh colored papule on right wrist. Cyst on left upper " "thigh.\"    Today, the patient notes concerns about the right forearm (spot that itches, been present for more than a year) and a rash on the shoulders and chest (present for more than a year, waxes and wanes, better when he is on humira, no past or current topicals).    Patient has been on Humira for about a year but was off for some time due to problems refilling.    Patient is otherwise feeling well, without additional concerns.    Labs:  NA    Physical exam:  Vitals: There were no vitals taken for this visit.  SKIN: Total skin excluding the undergarment areas was performed. The exam included the head/face, neck, both arms, chest, back, abdomen, buttocks, both legs, digits and/or nails.   - Carbajal Type III  - Scattered brown macules on sun exposed areas.  - Multiple regular brown pigmented macules and papules are identified on the trunk and extremities. About 20 nevi in all.  - There are waxy stuck on tan to brown papules on the back.  - There is a hyperpigmented reticulated scaly dermatitis on the back, central chest, and upper arms. V distribution.  - Skin colored scale throughout the beard area and the scalp.  - There is a 's nodule on the right wrist.  - No other lesions of concern on areas examined.     Medications:  Current Outpatient Medications   Medication     adalimumab (HUMIRA *CF*) 40 MG/0.4ML pen kit     medical cannabis (Patient's own supply)     Vitamin D, Cholecalciferol, 25 MCG (1000 UT) CAPS     methocarbamol (ROBAXIN) 750 MG tablet     No current facility-administered medications for this visit.       Past Medical History:   Patient Active Problem List   Diagnosis     CARDIOVASCULAR SCREENING; LDL GOAL LESS THAN 160     Spondyloarthropathy     No past medical history on file.     CC Balbina Alva PA-C  [address] [facility] [place] [address] on close of this encounter.      Again, thank you for allowing me to participate in the care of your patient.  "       Sincerely,        Charito Wade MD

## 2021-06-22 NOTE — PROGRESS NOTES
[facility] Dermatology Note  Encounter Date: Jun 22, 2021  Office Visit     Dermatology Problem List:  1. Relevant medical history: Spondyloarthropathy on humira  2. Seb derm, severe - TAC 0.1%, keto 2% shampoo    Family History: history of skin cancer in maternal grandfather, uncertain type.  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  ____________________________________________    ASSESSMENT/PLAN:    # Immunosuppressed with humira for spondyloarthropathy. Discussed increased risk of skin cancers with immunosuppressing medications.   - Recommend sunscreens SPF #30 or greater, protective clothing and avoidance of tanning beds.   - Recommended yearly skin exams.    # Sun damaged skin with solar lentigines. Chronic. Stable.   - Recommended sunscreens SPF #30 or greater, protective clothing and avoidance of tanning beds.     # Benign findings include: seborrheic keratoses. Self limited, minor.   - No further intervention required. Patient to report changes.  - Patient reassured of the benign nature of these lesions.    # Multiple clinically benign nevi. Chronic. Stable.   - No further intervention required. Patient to report changes.  - Patient reassured of the benign nature of these lesions.    # 's Nodule - right wrist. Chronic problem, flared with itch.  - Discussed the etiology of this diagnosis.  - Apply triamcinolone 0.1% ointment and cover with a waterproof bandage. Leave on until starts to peel off (typically 3-5 days).  - Discussed importance of avoiding picking at the location and breaking itch/scratch cycle.    # Rash, reticular on chest and back in V distribution: Initially I considered CARP, but patient notes that this rash gets better when he is on humira. Given seb derm elsewhere on body, I think this is the most likely diagnosis. Will treat for seb derm and re-evaluate in one month to ensure improved. If not improved, will  "biopsy.  - Start ketoconazole 2% shampoo for the scalp, face, chest, and back. Lather and let sit for 3 minutes then wash out. Alternate keto shampoo with on OTC anti dandruff shampoo.  - Start Triamcinolone 0.1% cream BID to the chest.    Procedures Performed:   None.    Follow-up: 4 weeks for seb derm, 1 year in-person for skin check, or earlier for new or changing lesions    Staff and Scribe:     Scribe Disclosure:   I, Brandi Medina, am serving as a scribe to document services personally performed by this physician, Dr. Charito Wade, based on data collection and the provider's statements to me.     Provider Disclosure:   The documentation recorded by the scribe accurately reflects the services I personally performed and the decisions made by me.    Charito Wade MD    Department of Dermatology  [facility]: Phone: [phone], Fax:[phone]  [facility]: Phone: [phone], Fax: [phone]    ____________________________________________    CC: Skin Check (Total skin check. Areas of concern-right forearm, shoulders and chest. On Humira-)    HPI:  Mr. Maverick Richards is a(n) 41 year old male who presents today as a new patient for skin check.    He is new to our department. He has not seen a dermatologist prior. He has no history of skin cancer, atypical moles, or precancerous lesions to his knowledge.    Referred by PCP for skin lesion on 4/21/21. Note from 4/21/21 states there is a spot on arm x several years-not growing but gets a scab that is not healing. Exam describes \"small flesh colored papule on right wrist. Cyst on left upper thigh.\"    Today, the patient notes concerns about the right forearm (spot that itches, been present for more than a year) and a rash on the shoulders and chest (present for more than a year, waxes and wanes, better when he is on humira, no past " or current topicals).    Patient has been on Humira for about a year but was off for some time due to problems refilling.    Patient is otherwise feeling well, without additional concerns.    Labs:  NA    Physical exam:  Vitals: There were no vitals taken for this visit.  SKIN: Total skin excluding the undergarment areas was performed. The exam included the head/face, neck, both arms, chest, back, abdomen, buttocks, both legs, digits and/or nails.   - Carbajal Type III  - Scattered brown macules on sun exposed areas.  - Multiple regular brown pigmented macules and papules are identified on the trunk and extremities. About 20 nevi in all.  - There are waxy stuck on tan to brown papules on the back.  - There is a hyperpigmented reticulated scaly dermatitis on the back, central chest, and upper arms. V distribution.  - Skin colored scale throughout the beard area and the scalp.  - There is a 's nodule on the right wrist.  - No other lesions of concern on areas examined.     Medications:  Current Outpatient Medications   Medication     adalimumab (HUMIRA *CF*) 40 MG/0.4ML pen kit     medical cannabis (Patient's own supply)     Vitamin D, Cholecalciferol, 25 MCG (1000 UT) CAPS     methocarbamol (ROBAXIN) 750 MG tablet     No current facility-administered medications for this visit.       Past Medical History:   Patient Active Problem List   Diagnosis     CARDIOVASCULAR SCREENING; LDL GOAL LESS THAN 160     Spondyloarthropathy     No past medical history on file.     CC Balbina Alva PA-C  [address] [facility] [place] [address] on close of this encounter.

## 2021-06-22 NOTE — PATIENT INSTRUCTIONS
For the chest:    I recommend you apply triamcinolone 0.1% cream to the chest.        For the scalp, face, chest, and back:    I recommend you apply ketoconazole 2% shampoo. Lather in and let sit for 3 minutes before rinsing out. Alternate the ketoconazole shampoo with an over-the-counter anti dandruff shampoo.

## 2021-06-22 NOTE — NURSING NOTE
Maverick Richards's goals for this visit include:   Chief Complaint   Patient presents with     Skin Check     total skin check. Areas of concern-right forearm, shoulders and chest. On Humira-       He requests these members of his care team be copied on today's visit information:     PCP: Balbina Alva    Referring Provider:  Balbina Alva PA-C  [address] [place] [address]    There were no vitals taken for this visit.    Do you need any medication refills at today's visit? Jacki Moore on 6/22/2021 at 2:05 PM

## 2021-07-09 ENCOUNTER — VIRTUAL VISIT (OUTPATIENT)
Dept: RHEUMATOLOGY | Facility: CLINIC | Age: 41
End: 2021-07-09
Payer: COMMERCIAL

## 2021-07-09 DIAGNOSIS — M46.90 AXIAL SPONDYLOARTHRITIS (H): Primary | ICD-10-CM

## 2021-07-09 DIAGNOSIS — M47.819 PERIPHERAL SPONDYLOARTHRITIS: ICD-10-CM

## 2021-07-09 DIAGNOSIS — M46.1 SACROILIITIS (H): ICD-10-CM

## 2021-07-09 PROCEDURE — 99214 OFFICE O/P EST MOD 30 MIN: CPT | Mod: 95 | Performed by: INTERNAL MEDICINE

## 2021-07-09 NOTE — PROGRESS NOTES
Maverick Richards is a 41 year old year old male who is being evaluated via a billable telephone visit.      What state will you be in during your phone visit? [place]  What phone number would you like to be contacted at? [phone]  How would you like to obtain your AVS? [facility]      Rheumatology Telephone/TeleHelath Visit      Maverick Richards MRN# [id number]   YOB: 1980 Age: 41 year old      Date of visit: 7/09/21   PCP: Balbina Alva    Chief Complaint   Patient presents with:  Spondyloarthropathy: Pain and stiffness off and on.    Assessment and Plan     1.  Spondyloarthropathy, axial and peripheral involvement: Establish care with me 2/5/2020 at which point he had R>L MCP, PIP, DIP joint involvement that is inflammatory in nature.  Ankle and knee pain is mechanical in nature.  Axial symptoms had mixed inflammatory and degenerative symptoms; right sacroiliitis suspected based on x-rays.  Additionally, he has a history of bright red blood and black stools 1-2 times per month for the past 2 years and this in the setting of inflammatory arthritis raises the concern for an inflammatory bowel disease so he has been previously referred to gastroenterology and he is planning to have a colonoscopy post-COVID19.  At the visit on 9/25/2020 he had not yet started Humira but he continued to have inflammatory joint symptoms.  Today, 7/9/2021, he reports that he was doing great when taking Humira consistently.  However, he has been taking Humira less consistently recently and has felt worse.  He had not followed up in clinic since starting Humira so this is the first time to evaluate efficacy; need to be on Humira consistently and then plan to follow-up in clinic in November 2021.   Chronic illness, progressive.    - Continue Humira 40mg SQ every 14 days     2.  Intermittent bright red blood and black stools, reportedly 1-2 times per month for a couple years: Seen by Dr. Alban Alcantara at [facility]; planning for  colonoscopy post-COVID19, and possibly sooner given the improvement but not resolution of the COVID-19 pandemic    3.  Left shoulder pain: Consistent with impingement syndrome.  I directed him to a handout of physical therapy exercises available online from the [employer] of orthopedic surgery.  I anticipate that this may improve with arthritis treatment as noted in #1.  Advised that if he like to go to formal physical therapy we may also do this but he prefers to avoid extra clinic visits due to COVID-19.    4.  Lower back pain with right-sided sciatica: Occurred a couple times last seen.  Advised physical therapy exercises that he plans to do at home instead to formal physical therapy to reduce risk for COVID-19 exposure.  I previously directed him to the handouts available from the [employer] of orthopedic [employer].      3. Vitamin D deficiency: Was on erogocalciferol 50,000 units once weekly and never had repeat labs for vitamin D.  Changed to vitamin D 1000 IU daily  - Continue vitamin D 1000 IU daily    - COVID-19: has received the Pfizer COVID-19 vaccine on 3/17/2021 and 4/7/2021    Total minutes spent in evaluation with patient, documentation, , and review of pertinent studies and chart notes: 9  (chronic illness, progressive; medication management)     Mr. Richards verbalized agreement with and understanding of the rational for the diagnosis and treatment plan.  All questions were answered to best of my ability and the patient's satisfaction. Mr. Richards was advised to contact the clinic with any questions that may arise after the clinic visit.      Thank you for involving me in the care of the patient    Return to clinic: 3 months      HPI     Maverick Richards is a 41 year old male who presents for follow-up of spondyloarthropathy    1/30/2020: Mr. Richards reported 5 years of right wrist pain, worse with certain movements that will cause pain for 1-7days that gradually improves and then  another thing will trigger it again; was a period of a doing okay with a couple months then he openned the refrigerator door and it happened again; doesn't swell; maybe increased warmth; sharp shooting pain; pain will radiate to the dorsal right 2nd-4th fingers and sometimes to the volar forearm just proximal to the right wrist.  L>R shoulder pain with certain positions and he demonstrates pain with abduction. R>L MCPs>PIPs>DIPs hurt; more pain in the AM, repetitive fine motor movement at work that worsens his pain; when doing heavy lifting prior to this job he had similar symptoms and would need to adjust how he lifted things. Elbows hurt sometimes. Hips are okay.  Knees hurt at the end of the day; improve with rest; no swelling.  Ankles are worse with activity; improve with rest; long history of spraining ankles over and over again; says that he has been eval'd several times including orthopedics and has been told to rest, splint sometimes with ACE bandages, and ice.  Toes are okay.  Morning stiffness for 1 hour, better with a hot shower; +gelling.  He then says that while he is active during the day he feels so much better, and if he sits down to rest then he feels terrible because it is so hard to get going again.  He says that when he gets home he has to get everything done right away because if he sits to relax then it is hard to get up again.     5/6/2020: no change in symptoms.  Didn't start Humira because of anticipation that it may change the colonoscopy results; wasn't concerned about COVID19 related issues.  Still with pain and stiffness and plans to start Humira for these symptoms as he doesn't know when the colonoscopy will be able to be done.     9/25/2020: Still with bloody stools intermittently but not for the last few weeks.  Pain at his MCPs more than the PIPs but also involving the DIPs that is worse in the morning and improves with time and activity.  Too much fine motor movement also bothers  his hands.  Morning stiffness for at least 1 hour but is better with a hot shower.  Positive gelling phenomenon.  Lower back stiffness and pain is worse in the morning and improves with time and activity.  Had a couple times of sharp lower back pain that radiated to his right leg and he went to a chiropractor but does not want to keep going to a chiropractor he says.  Continues on high-dose vitamin D but is now out.  Left shoulder pain with abduction above 90 degrees; range of motion intact per patient.      Today, 7/9/2021: First time evaluated since he started Humira.  He says that when he was on Humira consistently he felt much better.  Now taking Humira inconsistently and feels worse, with worsening morning stiffness and pain in his hands and back.  Pain and stiffness improves with time and activity.  Not having any bloody stools.    Denies fevers, chills, nausea, vomiting, constipation, diarrhea. No abdominal pain.   No chest pain/pressure, palpitations, or shortness of breath. No LE swelling. No neck pain. No oral or nasal sores.  No rash. No sicca symptoms. No photosensitivity or photophobia. No eye pain or redness. No history of inflammatory eye disease.      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 No pertinent past medical history.  Past Surgical History     Past Surgical History:   Procedure Laterality Date     ENT SURGERY      tonsilectomy age 1     Allergy   No Known Allergies  Current Medication List     Current Outpatient Medications   Medication Sig     adalimumab (HUMIRA *CF*) 40 MG/0.4ML pen kit Inject 0.4 mLs (40 mg) Subcutaneous every 14  "days . Hold for signs of infection, then seek medical attention. Rheumatology follow-up required prior to next refill     ketoconazole (NIZORAL) 2 % external shampoo Use small amount as shampoo and face/body wash every other day.     medical cannabis (Patient's own supply) See Admin Instructions (The purpose of this order is to document that the patient reports taking medical cannabis.  This is not a prescription, and is not used to certify that the patient has a qualifying medical condition.)     triamcinolone (KENALOG) 0.1 % external cream Apply a thin layer twice daily to affected areas as needed.     Vitamin D, Cholecalciferol, 25 MCG (1000 UT) CAPS Take 1,000 Units by mouth daily     No current facility-administered medications for this visit.          Social History   See HPI    Family History   No known family history of rheumatologic disorder    Physical Exam     Temp Readings from Last 3 Encounters:   04/21/21 98.3  F (36.8  C) (Oral)   07/31/19 97.4  F (36.3  C) (Oral)   10/06/14 97.3  F (36.3  C) (Oral)     BP Readings from Last 5 Encounters:   04/21/21 112/73   02/05/20 128/80   01/30/20 115/68   07/31/19 109/69   10/06/14 127/75     Pulse Readings from Last 1 Encounters:   04/21/21 72     Resp Readings from Last 1 Encounters:   No data found for Resp     Estimated body mass index is 32.61 kg/m  as calculated from the following:    Height as of 4/21/21: 1.664 m (5' 5.5\").    Weight as of 4/21/21: 90.3 kg (199 lb).    GEN: alert and no distress  PSYCH: Alert; coherent speech, normal rate and volume, able to articulate logical thoughts, able   to abstract reason, no tangential thoughts. Normal affect.   RESP: No cough, no audible wheezing, able to talk in full sentences  Remainder of exam unable to be completed due to telephone visits      Labs / Imaging (select studies)   RF/CCP  Recent Labs   Lab Test 01/30/20  0950   CCPIGG 1   RHF <20     CBC  Recent Labs   Lab Test 02/10/21  1630 01/30/20  0950   WBC " 7.7 6.5   RBC 4.95 5.00   HGB 14.2 14.3   HCT 44.3 43.6   MCV 90 87   RDW 13.6 13.1    265   MCH 28.7 28.6   MCHC 32.1 32.8   NEUTROPHIL 40.1 40.6   LYMPH 48.7 48.7   MONOCYTE 8.0 7.7   EOSINOPHIL 2.7 2.5   BASOPHIL 0.5 0.5   ANEU 3.1 2.6   ALYM 3.8 3.2   MASTER 0.6 0.5   AEOS 0.2 0.2   ABAS 0.0 0.0     CMP  Recent Labs   Lab Test 02/10/21  1630 01/30/20  0950 10/06/14  1009   NA  --   --  136   POTASSIUM  --   --  4.1   CHLORIDE  --   --  103   CO2  --   --  26   ANIONGAP  --   --  7   GLC  --   --  80   BUN  --   --  10   CR 0.95 0.90 0.95   GFRESTIMATED >90 >90 >90  Non African American GFR Calc     GFRESTBLACK >90 >90 >90  African American GFR Calc     KALEY  --   --  9.6   BILITOTAL 0.2 0.5 0.3   ALBUMIN 4.1 3.9 4.1   PROTTOTAL 7.3 7.5 8.0   ALKPHOS 70 64 75   AST 26 22 23   ALT 38 37 40     Calcium/VitaminD  Recent Labs   Lab Test 02/10/21  1630 01/30/20  0950 10/06/14  1009   KALEY  --   --  9.6   VITDT 21 10*  --      ESR/CRP  Recent Labs   Lab Test 02/10/21  1630 01/30/20  0950   SED 5 5   CRP <2.9 3.7     Hepatitis B  Recent Labs   Lab Test 01/30/20  0950   HBCAB Nonreactive   HEPBANG Nonreactive     Hepatitis C  Recent Labs   Lab Test 01/30/20  0950   HCVAB Nonreactive     Lyme ab screening  Recent Labs   Lab Test 01/30/20  0950   LYMEGM 0.19       Tuberculosis Screening  Recent Labs   Lab Test 02/05/20  1545   TBRES Negative         Recent Results (from the past 744 hour(s))   XR Sacroiliac Joint 1/2 Views    Narrative    SACROILIAC JOINT ONE TO TWO VIEWS   1/30/2020 10:36 AM     HISTORY:  Sacroiliitis question. Multiple joint pain.      Impression    IMPRESSION: I suspect mild right sacroiliitis versus degenerative  change.    MARIAN MARSHALL MD   XR Chest 2 Views    Narrative    XR CHEST 2 VW   1/30/2020 10:36 AM    INDICATION: Multiple joint pain.    COMPARISON: No pertinent comparison study is available for review.    FINDINGS:   No focal pulmonary infiltrate, pleural effusion, or pneumothorax.  The  cardiac size and mediastinal contours appear within normal limits.     No acute fracture or dislocation. No discrete osseous lesion or  radiopaque foreign body.     CONCLUSION:  No acute abnormality.    ELIZABETH KAT MD   XR Foot Bilateral G/E 3 Views    Narrative    FOOT BILATERAL THREE OR MORE VIEWS   1/30/2020 11:29 AM     HISTORY:  Polyarticular joint pain; evaluating for inflammatory  arthritis or other etiology to explain symptoms. Multiple joint pain.      Impression    IMPRESSION: Unremarkable exam.    MARIAN MARSHALL MD   XR Hand Bilateral G/E 3 Views    Narrative    HAND BILATERAL THREE OR MORE VIEWS 1/30/2020 11:29 AM     HISTORY: Polyarticular joint pain. Evaluating for inflammatory  arthritis or other etiology to explain symptoms. Multiple joint pain.      Impression    IMPRESSION:   1. Left: Positive ulnar variance. 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  2. Right: Unremarkable.    MARIAN MARSHALL MD         Immunization History     Immunization History   Administered Date(s) Administered     COVID-19,PF,Pfizer 03/17/2021, 04/07/2021     Td (Adult), Adsorbed 04/19/1999          Chart documentation done in part with Dragon Voice recognition Software. Although reviewed after completion, some word and grammatical error may remain.    Phone call duration with patient (in minutes): 5    Location of patient: [place], [place]   Location of provider: [place]    Miguel Huerta MD

## 2021-07-09 NOTE — PATIENT INSTRUCTIONS
RHEUMATOLOGY    Dr. Miguel Huerta    [facility]  [address] [place] [address]    Our new phone number for Rheumatology is [phone], this number will be able to help you schedule appointments for Dr. Huerta or if you have any message you would like sent to us.    Thank you for choosing [facility]!    Jade Laureano [facility] Rheumatology

## 2021-07-20 ENCOUNTER — OFFICE VISIT (OUTPATIENT)
Dept: DERMATOLOGY | Facility: CLINIC | Age: 41
End: 2021-07-20
Payer: COMMERCIAL

## 2021-07-20 DIAGNOSIS — L21.9 DERMATITIS, SEBORRHEIC: ICD-10-CM

## 2021-07-20 DIAGNOSIS — L83 CONFLUENT AND RETICULATED PAPILLOMATOSIS (CARP): Primary | ICD-10-CM

## 2021-07-20 DIAGNOSIS — L28.1 PICKER'S NODULE: ICD-10-CM

## 2021-07-20 PROCEDURE — 99214 OFFICE O/P EST MOD 30 MIN: CPT | Performed by: DERMATOLOGY

## 2021-07-20 RX ORDER — TRIAMCINOLONE ACETONIDE 1 MG/G
CREAM TOPICAL
Qty: 160 G | Refills: 11 | Status: SHIPPED | OUTPATIENT
Start: 2021-07-20

## 2021-07-20 RX ORDER — MINOCYCLINE HYDROCHLORIDE 100 MG/1
100 CAPSULE ORAL 2 TIMES DAILY
Qty: 60 CAPSULE | Refills: 1 | Status: SHIPPED | OUTPATIENT
Start: 2021-07-20

## 2021-07-20 ASSESSMENT — PAIN SCALES - GENERAL: PAINLEVEL: NO PAIN (0)

## 2021-07-20 NOTE — LETTER
7/20/2021         RE: Maverick Richards  [address] [place] [address]        Dear Colleague,    Thank you for referring your patient, Maverick Richards, to the [facility]. Please see a copy of my visit note below.    [facility] Dermatology Note  Encounter Date: Jul 20, 2021  Office Visit     Dermatology Problem List:  1. Relevant medical history: Spondyloarthropathy on humira  2. Seb derm, scalp and beard - keto 2% shampoo  3. Seb derm vs CARP, chest and back:   - TAC 0.1%, keto 2% shampoo  - Orestes 100mg BID started 7/20/21     Family History: history of skin cancer in maternal grandfather, uncertain type.  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  ____________________________________________    ASSESSMENT/PLAN:    # 's Nodule - right wrist. Chronic problem, stable. Much flatter today.   - Continue to apply triamcinolone 0.1% ointment and cover with a waterproof bandage. Leave on until starts to peel off (typically 3-5 days).  - Discussed importance of avoiding picking at the location and breaking itch/scratch cycle.    # Rash, reticular on chest and back in V distribution: Moderate improvement after treatment for seb derm - less erythema and scale but brown hyperpigmentation is reticulated and still present in patches. Will attempt to treat as CARP with minocycline while continuing coverage for seb derm given that he did improve.  - Continue ketoconazole 2% shampoo for the scalp, face, chest, and back. Lather and let sit for 3 minutes then wash out. Alternate keto shampoo with on OTC anti dandruff shampoo.  - Continue Triamcinolone 0.1% cream BID to the chest/back. Refilled today  - Start minoxycline 100 mg BID x 60 days. Discussed ok to stop if rash resolves prior to finishing course. Discussed side effects including GI upset, nausea, vomiting, vertigo, vision changes, headaches, skin rashes, joint pain,  muscle pain, severe abdominal pain. Patient instructed to reach out with any severe side effects.    Procedures Performed:   None.    Follow-up: 8 weeks in-person, or earlier for new or changing lesions    Staff and Scribe:     Scribe Disclosure:   I, Carlos Barrios, am serving as a scribe to document services personally performed by this physician, Dr. Charito Wade, based on data collection and the provider's statements to me.     Provider Disclosure:   The documentation recorded by the scribe accurately reflects the services I personally performed and the decisions made by me.    Charito Wade MD    Department of Dermatology  [facility]: Phone: [phone], Fax:[phone]  [facility] [place]: Phone: [phone], Fax: [phone]    ____________________________________________    CC: Rash (F/u rash on chest and back. Improving. )    HPI:  Mr. Maverick Richards is a(n) 41 year old male who presents today as a return patient for a rash follow up.    Last seen 6/22/21 for a skin check. At that time, a rash on the chest and back with a V distribution was treated as seb derm, given seb derm elsewhere on the body and location, although CARP was considered in the differential. Patient started on ketoconazole 2% shampoo to the scalp, face, chest and back, alternated with OTC anti dandruff shampoo. Also started on TAC 0.1% cream BID to the chest. Patient instructed to follow up in 1 month to recheck.    Today, he believes his rash has improved. His rash is worse on his back currently because he cannot reach it as well with topicals. He notes much less redness to rash and much less skin irritation.     He notes the pickers nodule on his right wrist has improved as well since his previous visit.    Patient is otherwise feeling well, without additional concerns.    Labs:   NA    Physical  exam:  Vitals: There were no vitals taken for this visit.  SKIN: Focused examination of the the back, chest and abdomen was performed.  - Carbajal Type III  - Hyperpigmented reticular patches with subtle overlying skin colored scale on chest and back in a V  - Papule with central hypopigmentation and peripheral hyperpigmentation on the right central wrist. Almost completely flattened.  - No other lesions of concern on areas examined.     Medications:  Current Outpatient Medications   Medication     adalimumab (HUMIRA *CF*) 40 MG/0.4ML pen kit     ketoconazole (NIZORAL) 2 % external shampoo     medical cannabis (Patient's own supply)     triamcinolone (KENALOG) 0.1 % external cream     Vitamin D, Cholecalciferol, 25 MCG (1000 UT) CAPS     No current facility-administered medications for this visit.      Past Medical History:   Patient Active Problem List   Diagnosis     CARDIOVASCULAR SCREENING; LDL GOAL LESS THAN 160     Spondyloarthropathy     No past medical history on file.     CC No referring provider defined for this encounter. on close of this encounter.      Again, thank you for allowing me to participate in the care of your patient.        Sincerely,        Charito Wade MD

## 2021-07-20 NOTE — NURSING NOTE
Maverick Richards's goals for this visit include:   Chief Complaint   Patient presents with     Rash     F/u rash on chest and back. Improving.        He requests these members of his care team be copied on today's visit information:     PCP: Balbina Alva    Referring Provider:  No referring provider defined for this encounter.    There were no vitals taken for this visit.    Do you need any medication refills at today's visit? devin Moore on 7/20/2021 at 3:18 PM

## 2021-07-20 NOTE — PROGRESS NOTES
[facility] Dermatology Note  Encounter Date: Jul 20, 2021  Office Visit     Dermatology Problem List:  1. Relevant medical history: Spondyloarthropathy on humira  2. Seb derm, scalp and beard - keto 2% shampoo  3. Seb derm vs CARP, chest and back:   - TAC 0.1%, keto 2% shampoo  - Orestes 100mg BID started 7/20/21     Family History: history of skin cancer in maternal grandfather, uncertain type.  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  ____________________________________________    ASSESSMENT/PLAN:    # 's Nodule - right wrist. Chronic problem, stable. Much flatter today.   - Continue to apply triamcinolone 0.1% ointment and cover with a waterproof bandage. Leave on until starts to peel off (typically 3-5 days).  - Discussed importance of avoiding picking at the location and breaking itch/scratch cycle.    # Rash, reticular on chest and back in V distribution: Moderate improvement after treatment for seb derm - less erythema and scale but brown hyperpigmentation is reticulated and still present in patches. Will attempt to treat as CARP with minocycline while continuing coverage for seb derm given that he did improve.  - Continue ketoconazole 2% shampoo for the scalp, face, chest, and back. Lather and let sit for 3 minutes then wash out. Alternate keto shampoo with on OTC anti dandruff shampoo.  - Continue Triamcinolone 0.1% cream BID to the chest/back. Refilled today  - Start minoxycline 100 mg BID x 60 days. Discussed ok to stop if rash resolves prior to finishing course. Discussed side effects including GI upset, nausea, vomiting, vertigo, vision changes, headaches, skin rashes, joint pain, muscle pain, severe abdominal pain. Patient instructed to reach out with any severe side effects.    Procedures Performed:   None.    Follow-up: 8 weeks in-person, or earlier for new or changing lesions    Staff and Scribe:     Scribe Disclosure:   MAXX  Carlos Barrios, am serving as a scribe to document services personally performed by this physician, Dr. Charito Wade, based on data collection and the provider's statements to me.     Provider Disclosure:   The documentation recorded by the scribe accurately reflects the services I personally performed and the decisions made by me.    Chariot Wade MD    Department of Dermatology  [facility]: Phone: [phone], Fax:[phone]  [facility]: Phone: [phone], Fax: [phone]    ____________________________________________    CC: Rash (F/u rash on chest and back. Improving. )    HPI:  Mr. Maverick Richards is a(n) 41 year old male who presents today as a return patient for a rash follow up.    Last seen 6/22/21 for a skin check. At that time, a rash on the chest and back with a V distribution was treated as seb derm, given seb derm elsewhere on the body and location, although CARP was considered in the differential. Patient started on ketoconazole 2% shampoo to the scalp, face, chest and back, alternated with OTC anti dandruff shampoo. Also started on TAC 0.1% cream BID to the chest. Patient instructed to follow up in 1 month to recheck.    Today, he believes his rash has improved. His rash is worse on his back currently because he cannot reach it as well with topicals. He notes much less redness to rash and much less skin irritation.     He notes the pickers nodule on his right wrist has improved as well since his previous visit.    Patient is otherwise feeling well, without additional concerns.    Labs:   NA    Physical exam:  Vitals: There were no vitals taken for this visit.  SKIN: Focused examination of the the back, chest and abdomen was performed.  - Carbajal Type III  - Hyperpigmented reticular patches with subtle overlying skin colored scale on chest and back in a V  -  Papule with central hypopigmentation and peripheral hyperpigmentation on the right central wrist. Almost completely flattened.  - No other lesions of concern on areas examined.     Medications:  Current Outpatient Medications   Medication     adalimumab (HUMIRA *CF*) 40 MG/0.4ML pen kit     ketoconazole (NIZORAL) 2 % external shampoo     medical cannabis (Patient's own supply)     triamcinolone (KENALOG) 0.1 % external cream     Vitamin D, Cholecalciferol, 25 MCG (1000 UT) CAPS     No current facility-administered medications for this visit.      Past Medical History:   Patient Active Problem List   Diagnosis     CARDIOVASCULAR SCREENING; LDL GOAL LESS THAN 160     Spondyloarthropathy     No past medical history on file.     CC No referring provider defined for this encounter. on close of this encounter.

## 2021-09-19 ENCOUNTER — HEALTH MAINTENANCE LETTER (OUTPATIENT)
Age: 41
End: 2021-09-19

## 2021-09-23 ENCOUNTER — OFFICE VISIT (OUTPATIENT)
Dept: DERMATOLOGY | Facility: CLINIC | Age: 41
End: 2021-09-23
Payer: COMMERCIAL

## 2021-09-23 DIAGNOSIS — B36.0 TV (TINEA VERSICOLOR): Primary | ICD-10-CM

## 2021-09-23 DIAGNOSIS — L83 CONFLUENT AND RETICULATED PAPILLOMATOSIS (CARP): ICD-10-CM

## 2021-09-23 PROCEDURE — 99213 OFFICE O/P EST LOW 20 MIN: CPT | Performed by: DERMATOLOGY

## 2021-09-23 NOTE — PROGRESS NOTES
[facility] Dermatology Note  Encounter Date: Sep 23, 2021  Office Visit     Dermatology Problem List:  1. Relevant medical history: Spondyloarthropathy on humira  2. Seb derm, scalp and beard - keto 2% shampoo  3. 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  Family History: History of skin cancer in maternal grandfather, uncertain type.  ____________________________________________     ASSESSMENT/PLAN:     # Rash, reticular on chest and back in V distribution previously noted with CARP: improved from prior visit. I think most likely diagnosis is overlap of tinea versicolor and CARP. Discussed that both these conditions can recur. Plan below:  - For maintenance: continue ketoconazole 2% shampoo for the scalp, face, chest, and back. Lather and let sit for 3 minutes then wash out.  - For flares of rash: Resume  Triamcinolone 0.1% cream BID to the chest/back  - Stop minocycline. Discussed this may be needed again in the future. Patient does have some left over that he will keep for now.    Procedures Performed:   None.    Follow-up: yearly for refills, sooner for concerns.    Staff and Scribe:     Scribe Disclosure:   I, Brandi Medina, am serving as a scribe to document services personally performed by this physician, Dr. Charito Wade, based on data collection and the provider's statements to me.     Provider Disclosure:   The documentation recorded by the scribe accurately reflects the services I personally performed and the decisions made by me.    Charito Wade MD    Department of Dermatology  [facility]: Phone: [phone], Fax:[phone]  [facility]: Phone: [phone], Fax:  [phone]    ____________________________________________    CC: Derm Problem (recheck rash on chest and back, almost clear)    HPI:  Mr. Maverick Richards is a(n) 41 year old male who presents today as a return patient for rash.    Last seen 7/20/21. At that time, plan was to continue keto shampoo and triamcinolone 0.1% cream for tinea versicolor coverage. Also started minocycline 100mg BID x 2 months to cover for CARP.    Today patient notes the rash on the chest and back is almost completely clear. Also notes concern of skin swelling in the nose. Wondering if it is related.    Patient is otherwise feeling well, without additional concerns.    Labs:  NA    Physical exam:  Vitals: There were no vitals taken for this visit.  SKIN: Focused examination of the back, chest, abdomen, and arms was performed.  - Hyperpigmented patches without overlying scale from the upper back to lower back in a V distribution. No scale.  - Subtle hyperpigmented macules on the central chest and central superior abdomen. No scale.  - Arms are nearly clear.  - No other lesions of concern on areas examined.     Medications:  Current Outpatient Medications   Medication     adalimumab (HUMIRA *CF*) 40 MG/0.4ML pen kit     ketoconazole (NIZORAL) 2 % external shampoo     medical cannabis (Patient's own supply)     minocycline (MINOCIN) 100 MG capsule     triamcinolone (KENALOG) 0.1 % external cream     Vitamin D, Cholecalciferol, 25 MCG (1000 UT) CAPS     No current facility-administered medications for this visit.      Past Medical History:   Patient Active Problem List   Diagnosis     CARDIOVASCULAR SCREENING; LDL GOAL LESS THAN 160     Spondyloarthropathy     No past medical history on file.     CC No referring provider defined for this encounter. on close of this encounter.

## 2021-09-23 NOTE — NURSING NOTE
Maverick Richards's goals for this visit include:   Chief Complaint   Patient presents with     Derm Problem     recheck rash on chest and back, almost clear       He requests these members of his care team be copied on today's visit information: no    PCP: Balbina Alva    Referring Provider:  No referring provider defined for this encounter.    There were no vitals taken for this visit.    Do you need any medication refills at today's visit? No    Rose Mendez LPN

## 2021-09-23 NOTE — LETTER
9/23/2021         RE: Maverick Richards  [address] [place] [address]        Dear Colleague,    Thank you for referring your patient, Maverick Richards, to the [facility]. Please see a copy of my visit note below.    [facility] Dermatology Note  Encounter Date: Sep 23, 2021  Office Visit     Dermatology Problem List:  1. Relevant medical history: Spondyloarthropathy on humira  2. Seb derm, scalp and beard - keto 2% shampoo  3. 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  Family History: History of skin cancer in maternal grandfather, uncertain type.  ____________________________________________     ASSESSMENT/PLAN:     # Rash, reticular on chest and back in V distribution previously noted with CARP: improved from prior visit. I think most likely diagnosis is overlap of tinea versicolor and CARP. Discussed that both these conditions can recur. Plan below:  - For maintenance: continue ketoconazole 2% shampoo for the scalp, face, chest, and back. Lather and let sit for 3 minutes then wash out.  - For flares of rash: Resume  Triamcinolone 0.1% cream BID to the chest/back  - Stop minocycline. Discussed this may be needed again in the future. Patient does have some left over that he will keep for now.    Procedures Performed:   None.    Follow-up: yearly for refills, sooner for concerns.    Staff and Scribe:     Scribe Disclosure:   I, Brandi Medina, am serving as a scribe to document services personally performed by this physician, Dr. Charito Wade, based on data collection and the provider's statements to me.     Provider Disclosure:   The documentation recorded by the scribe accurately reflects the services I personally performed and the decisions made by me.    Charito Wade MD  Assistant  Professor  Department of Dermatology  [facility]: Phone: [phone], Fax:[phone]  [facility]: Phone: [phone], Fax: [phone]    ____________________________________________    CC: Derm Problem (recheck rash on chest and back, almost clear)    HPI:  Mr. Maverick Richards is a(n) 41 year old male who presents today as a return patient for rash.    Last seen 7/20/21. At that time, plan was to continue keto shampoo and triamcinolone 0.1% cream for tinea versicolor coverage. Also started minocycline 100mg BID x 2 months to cover for CARP.    Today patient notes the rash on the chest and back is almost completely clear. Also notes concern of skin swelling in the nose. Wondering if it is related.    Patient is otherwise feeling well, without additional concerns.    Labs:  NA    Physical exam:  Vitals: There were no vitals taken for this visit.  SKIN: Focused examination of the back, chest, abdomen, and arms was performed.  - Hyperpigmented patches without overlying scale from the upper back to lower back in a V distribution. No scale.  - Subtle hyperpigmented macules on the central chest and central superior abdomen. No scale.  - Arms are nearly clear.  - No other lesions of concern on areas examined.     Medications:  Current Outpatient Medications   Medication     adalimumab (HUMIRA *CF*) 40 MG/0.4ML pen kit     ketoconazole (NIZORAL) 2 % external shampoo     medical cannabis (Patient's own supply)     minocycline (MINOCIN) 100 MG capsule     triamcinolone (KENALOG) 0.1 % external cream     Vitamin D, Cholecalciferol, 25 MCG (1000 UT) CAPS     No current facility-administered medications for this visit.      Past Medical History:   Patient Active Problem List   Diagnosis     CARDIOVASCULAR SCREENING; LDL GOAL LESS THAN 160     Spondyloarthropathy     No past medical history on file.     CC No referring  provider defined for this encounter. on close of this encounter.      Again, thank you for allowing me to participate in the care of your patient.        Sincerely,        Charito Wade MD

## 2021-10-11 ENCOUNTER — E-VISIT (OUTPATIENT)
Dept: URGENT CARE | Facility: URGENT CARE | Age: 41
End: 2021-10-11
Payer: COMMERCIAL

## 2021-10-11 ENCOUNTER — LAB (OUTPATIENT)
Dept: FAMILY MEDICINE | Facility: CLINIC | Age: 41
End: 2021-10-11
Attending: NURSE PRACTITIONER

## 2021-10-11 DIAGNOSIS — Z20.822 SUSPECTED COVID-19 VIRUS INFECTION: ICD-10-CM

## 2021-10-11 DIAGNOSIS — Z20.822 SUSPECTED COVID-19 VIRUS INFECTION: Primary | ICD-10-CM

## 2021-10-11 PROCEDURE — 99421 OL DIG E/M SVC 5-10 MIN: CPT | Performed by: NURSE PRACTITIONER

## 2021-10-11 PROCEDURE — U0005 INFEC AGEN DETEC AMPLI PROBE: HCPCS

## 2021-10-11 PROCEDURE — U0003 INFECTIOUS AGENT DETECTION BY NUCLEIC ACID (DNA OR RNA); SEVERE ACUTE RESPIRATORY SYNDROME CORONAVIRUS 2 (SARS-COV-2) (CORONAVIRUS DISEASE [COVID-19]), AMPLIFIED PROBE TECHNIQUE, MAKING USE OF HIGH THROUGHPUT TECHNOLOGIES AS DESCRIBED BY CMS-2020-01-R: HCPCS

## 2021-10-11 NOTE — PATIENT INSTRUCTIONS
Dear Maverick Richards,    Your symptoms show that you may have coronavirus (COVID-19). This illness can cause fever, cough and trouble breathing. Many people get a mild case and get better on their own. Some people can get very sick.    Will I be tested for COVID-19?  We would like to test you for Covid-19 virus. I have placed orders for this test.     To schedule: go to your [facility] home page and scroll down to the section that says  You have an appointment that needs to be scheduled  and click the large green button that says  Schedule Now  and follow the steps to find the next available openings.    If you are unable to complete these [facility] scheduling steps, please call [phone] to schedule your testing.     Return to work/school/ guidance:  Please let your workplace manager and staffing office know when your quarantine ends     We can t give you an exact date as it depends on the above. You can calculate this on your own or work with your manager/staffing office to calculate this. (For example if you were exposed on 10/4, you would have to quarantine for 14 full days. That would be through 10/18. You could return on 10/19.)      If you receive a positive COVID-19 test result, follow the guidance of the those who are giving you the results. Usually the return to work is 10 (or in some cases 20 days from symptom onset.) If you work at [facility] [employer] [employer], you must also be cleared by Employee Occupational Health and Safety to return to work.        If you receive a negative COVID-19 test result and did not have a high risk exposure to someone with a known positive COVID-19 test, you can return to work once you're free of fever for 24 hours without fever-reducing medication and your symptoms are improving or resolved.      If you receive a negative COVID-19 test and If you had a high risk exposure to someone who has tested positive for COVID-19 then you can return to work 14 days after your last contact  with the positive individual    Note: If you have ongoing exposure to the covid positive person, this quarantine period may be more than 14 days. (For example, if you are continued to be exposed to your child who tested positive and cannot isolate from them, then the quarantine of 7-14 days can't start until your child is no longer contagious. This is typically 10 days from onset of the child's symptoms. So the total duration may be 17-24 days in this case.)    Sign up for [employer].   We know it's scary to hear that you might have COVID-19. We want to track your symptoms to make sure you're okay over the next 2 weeks. Please look for an email from [employer]--this is a free, online program that we'll use to keep in touch. To sign up, follow the link in the email you will receive. Learn more at [email or web address]    How can I take care of myself?    Get lots of rest. Drink extra fluids (unless a doctor has told you not to)    Take Tylenol (acetaminophen) or ibuprofen for fever or pain. If you have liver or kidney problems, ask your family doctor if it's okay to take Tylenol o ibuprofen    If you have other health problems (like cancer, heart failure, an organ transplant or severe kidney disease): Call your specialty clinic if you don't feel better in the next 2 days.    Know when to call 911. Emergency warning signs include:  o Trouble breathing or shortness of breath  o Pain or pressure in the chest that doesn't go away  o Feeling confused like you haven't felt before, or not being able to wake up  o Bluish-colored lips or face    Where can I get more information?  [employer] [place] - About COVID-19:   [email or web address]    CDC - What to Do If You're Sick:   www.cdc.gov/coronavirus/2019-ncov/about/steps-when-sick.html

## 2021-10-12 LAB — SARS-COV-2 RNA RESP QL NAA+PROBE: POSITIVE

## 2021-11-05 ENCOUNTER — LAB (OUTPATIENT)
Dept: LAB | Facility: CLINIC | Age: 41
End: 2021-11-05

## 2021-11-05 ENCOUNTER — OFFICE VISIT (OUTPATIENT)
Dept: RHEUMATOLOGY | Facility: CLINIC | Age: 41
End: 2021-11-05
Payer: COMMERCIAL

## 2021-11-05 VITALS
HEART RATE: 87 BPM | DIASTOLIC BLOOD PRESSURE: 80 MMHG | WEIGHT: 200.8 LBS | HEIGHT: 66 IN | BODY MASS INDEX: 32.27 KG/M2 | SYSTOLIC BLOOD PRESSURE: 120 MMHG | OXYGEN SATURATION: 100 %

## 2021-11-05 DIAGNOSIS — M46.90 AXIAL SPONDYLOARTHRITIS (H): Primary | ICD-10-CM

## 2021-11-05 DIAGNOSIS — Z79.899 HIGH RISK MEDICATION USE: ICD-10-CM

## 2021-11-05 DIAGNOSIS — M46.1 SACROILIITIS (H): ICD-10-CM

## 2021-11-05 DIAGNOSIS — Z23 NEED FOR TDAP VACCINATION: ICD-10-CM

## 2021-11-05 DIAGNOSIS — M46.90 AXIAL SPONDYLOARTHRITIS (H): ICD-10-CM

## 2021-11-05 LAB
ALBUMIN SERPL-MCNC: 3.8 G/DL (ref 3.4–5)
ALP SERPL-CCNC: 61 U/L (ref 40–150)
ALT SERPL W P-5'-P-CCNC: 42 U/L (ref 0–70)
AST SERPL W P-5'-P-CCNC: 26 U/L (ref 0–45)
BASOPHILS # BLD AUTO: 0 10E3/UL (ref 0–0.2)
BASOPHILS NFR BLD AUTO: 0 %
BILIRUB DIRECT SERPL-MCNC: 0.1 MG/DL (ref 0–0.2)
BILIRUB SERPL-MCNC: 0.5 MG/DL (ref 0.2–1.3)
CREAT SERPL-MCNC: 0.95 MG/DL (ref 0.66–1.25)
CRP SERPL-MCNC: <2.9 MG/L (ref 0–8)
EOSINOPHIL # BLD AUTO: 0.2 10E3/UL (ref 0–0.7)
EOSINOPHIL NFR BLD AUTO: 3 %
ERYTHROCYTE [DISTWIDTH] IN BLOOD BY AUTOMATED COUNT: 13.6 % (ref 10–15)
ERYTHROCYTE [SEDIMENTATION RATE] IN BLOOD BY WESTERGREN METHOD: 7 MM/HR (ref 0–15)
GFR SERPL CREATININE-BSD FRML MDRD: >90 ML/MIN/1.73M2
HCT VFR BLD AUTO: 42 % (ref 40–53)
HGB BLD-MCNC: 13.7 G/DL (ref 13.3–17.7)
LYMPHOCYTES # BLD AUTO: 3.1 10E3/UL (ref 0.8–5.3)
LYMPHOCYTES NFR BLD AUTO: 42 %
MCH RBC QN AUTO: 29.1 PG (ref 26.5–33)
MCHC RBC AUTO-ENTMCNC: 32.6 G/DL (ref 31.5–36.5)
MCV RBC AUTO: 89 FL (ref 78–100)
MONOCYTES # BLD AUTO: 0.6 10E3/UL (ref 0–1.3)
MONOCYTES NFR BLD AUTO: 8 %
NEUTROPHILS # BLD AUTO: 3.4 10E3/UL (ref 1.6–8.3)
NEUTROPHILS NFR BLD AUTO: 47 %
PLATELET # BLD AUTO: 249 10E3/UL (ref 150–450)
PROT SERPL-MCNC: 7 G/DL (ref 6.8–8.8)
RBC # BLD AUTO: 4.7 10E6/UL (ref 4.4–5.9)
WBC # BLD AUTO: 7.4 10E3/UL (ref 4–11)

## 2021-11-05 PROCEDURE — 85025 COMPLETE CBC W/AUTO DIFF WBC: CPT

## 2021-11-05 PROCEDURE — 90471 IMMUNIZATION ADMIN: CPT | Performed by: INTERNAL MEDICINE

## 2021-11-05 PROCEDURE — 99214 OFFICE O/P EST MOD 30 MIN: CPT | Mod: 25 | Performed by: INTERNAL MEDICINE

## 2021-11-05 PROCEDURE — 85652 RBC SED RATE AUTOMATED: CPT

## 2021-11-05 PROCEDURE — 90715 TDAP VACCINE 7 YRS/> IM: CPT | Performed by: INTERNAL MEDICINE

## 2021-11-05 PROCEDURE — 86481 TB AG RESPONSE T-CELL SUSP: CPT

## 2021-11-05 PROCEDURE — 82565 ASSAY OF CREATININE: CPT

## 2021-11-05 PROCEDURE — 80076 HEPATIC FUNCTION PANEL: CPT

## 2021-11-05 PROCEDURE — 36415 COLL VENOUS BLD VENIPUNCTURE: CPT

## 2021-11-05 PROCEDURE — 86140 C-REACTIVE PROTEIN: CPT

## 2021-11-05 ASSESSMENT — MIFFLIN-ST. JEOR: SCORE: 1753.57

## 2021-11-05 NOTE — NURSING NOTE
RAPID3 (0-30) Cumulative Score  4.3          RAPID3 Weighted Score (divide #4 by 3 and that is the weighted score)  1.4

## 2021-11-07 LAB
GAMMA INTERFERON BACKGROUND BLD IA-ACNC: 0.17 IU/ML
M TB IFN-G BLD-IMP: NEGATIVE
M TB IFN-G CD4+ BCKGRND COR BLD-ACNC: 9.83 IU/ML
MITOGEN IGNF BCKGRD COR BLD-ACNC: -0.03 IU/ML
MITOGEN IGNF BCKGRD COR BLD-ACNC: 0 IU/ML
QUANTIFERON MITOGEN: 10 IU/ML
QUANTIFERON NIL TUBE: 0.17 IU/ML
QUANTIFERON TB1 TUBE: 0.17 IU/ML
QUANTIFERON TB2 TUBE: 0.14

## 2022-04-22 DIAGNOSIS — M46.1 SACROILIITIS (H): ICD-10-CM

## 2022-04-22 DIAGNOSIS — M46.90 AXIAL SPONDYLOARTHRITIS (H): ICD-10-CM

## 2022-04-22 NOTE — TELEPHONE ENCOUNTER
Routing refill request to provider for review/approval because:  Drug not on the [facility] refill protocol     Requested Prescriptions   Pending Prescriptions Disp Refills    adalimumab (HUMIRA *CF*) 40 MG/0.4ML pen kit 0.8 mL 4     Sig: Inject 0.4 mLs (40 mg) Subcutaneous every 14 days . Hold for signs of infection, then seek medical attention.        There is no refill protocol information for this order           Michelle Robbins RN  [facility]

## 2022-04-27 ENCOUNTER — DOCUMENTATION ONLY (OUTPATIENT)
Dept: LAB | Facility: CLINIC | Age: 42
End: 2022-04-27
Payer: COMMERCIAL

## 2022-04-28 NOTE — PROGRESS NOTES
Left message for patient to return call to clinic. Also sent patient a [employer] message.    Pk Estrada RN....4/28/2022 8:44 AM

## 2022-04-28 NOTE — PROGRESS NOTES
Patient returned the call and was informed that labs are not needed. Patient verbalized understanding. Cancelled patient's lab apt.    Pk WHEELER RN....4/28/2022 8:46 AM

## 2022-04-28 NOTE — PROGRESS NOTES
RN: please notify Maverick Richards that the 5/2/2022 lab appointment is not needed if for rheumatology labs only.  No rheumatology labs are needed prior to the 5/5/2022 follow-up appointment.  If the lab appointment was only for rheumatology labs then please cancel that appointment.     Miguel Huerta MD  4/27/2022

## 2022-05-05 ENCOUNTER — OFFICE VISIT (OUTPATIENT)
Dept: RHEUMATOLOGY | Facility: CLINIC | Age: 42
End: 2022-05-05
Payer: COMMERCIAL

## 2022-05-05 VITALS
SYSTOLIC BLOOD PRESSURE: 128 MMHG | WEIGHT: 192 LBS | DIASTOLIC BLOOD PRESSURE: 83 MMHG | BODY MASS INDEX: 30.99 KG/M2 | OXYGEN SATURATION: 100 % | HEART RATE: 63 BPM

## 2022-05-05 DIAGNOSIS — M25.512 CHRONIC PAIN OF BOTH SHOULDERS: ICD-10-CM

## 2022-05-05 DIAGNOSIS — M25.551 BILATERAL HIP PAIN: ICD-10-CM

## 2022-05-05 DIAGNOSIS — M46.90 AXIAL SPONDYLOARTHRITIS (H): Primary | ICD-10-CM

## 2022-05-05 DIAGNOSIS — M25.511 CHRONIC PAIN OF BOTH SHOULDERS: ICD-10-CM

## 2022-05-05 DIAGNOSIS — E55.9 VITAMIN D DEFICIENCY: ICD-10-CM

## 2022-05-05 DIAGNOSIS — Z23 NEED FOR VACCINATION: ICD-10-CM

## 2022-05-05 DIAGNOSIS — M25.552 BILATERAL HIP PAIN: ICD-10-CM

## 2022-05-05 DIAGNOSIS — G89.29 CHRONIC PAIN OF BOTH SHOULDERS: ICD-10-CM

## 2022-05-05 DIAGNOSIS — M46.1 SACROILIITIS (H): ICD-10-CM

## 2022-05-05 PROCEDURE — 90471 IMMUNIZATION ADMIN: CPT | Performed by: INTERNAL MEDICINE

## 2022-05-05 PROCEDURE — 90670 PCV13 VACCINE IM: CPT | Performed by: INTERNAL MEDICINE

## 2022-05-05 PROCEDURE — 99214 OFFICE O/P EST MOD 30 MIN: CPT | Mod: 25 | Performed by: INTERNAL MEDICINE

## 2022-05-05 RX ORDER — CHOLECALCIFEROL (VITAMIN D3) 50 MCG
1 TABLET ORAL DAILY
Qty: 90 TABLET | Refills: 3 | Status: SHIPPED | OUTPATIENT
Start: 2022-05-05

## 2022-05-05 NOTE — PATIENT INSTRUCTIONS
RHEUMATOLOGY    Dr. Miguel Huerta    [address] [place] [address]  Phone number: [phone]  Fax number: [phone]      Thank you for choosing [facility]!    Jade Laureano CMA Rheumatology

## 2022-05-05 NOTE — PROGRESS NOTES
Rheumatology Clinic Visit      Maverick Richards MRN# [id number]   YOB: 1980 Age: 41 year old      Date of visit: 5/05/22   PCP: Balbina Alva    Chief Complaint   Patient presents with:  Axial spondyloarthritis: Had some weird hip thing going on.    Assessment and Plan     1.  Spondyloarthropathy, axial and peripheral involvement: Establish care with me 2/5/2020 at which point he had R>L MCP, PIP, DIP joint involvement that is inflammatory in nature.  Ankle and knee pain is degenerative in nature.  Axial symptoms had mixed inflammatory and degenerative symptoms; right sacroiliitis suspected based on x-rays.  Additionally, he has a history of bright red blood and black stools 1-2 times per month starting in 2018 and this in the setting of inflammatory arthritis raises the concern for an inflammatory bowel disease so he has been previously referred to gastroenterology and he is planning to have a colonoscopy post-COVID19 pandemic.  At the visit on 9/25/2020 he had not yet started Humira but he continued to have inflammatory joint symptoms.  Humira was then started prior to having any GI work-up and he has felt much better, worse when he was not on Humira when he was lost to follow-up.  Inflammatory joint symptoms are well controlled at this time.  Minimal morning stiffness and no joint swelling.  Degenerative hip and shoulder pain as noted below.  Continue Humira monotherapy.   Chronic illness, stable.    - Continue Humira 40mg SQ every 14 days   - Advised stretching exercises daily, with programs such as yoga  - Labs in 3-4 months: CBC, Creatinine, Hepatic Panel, ESR, CRP    2.  Intermittent bright red blood and black stools, reportedly 1-2 times per month for a couple years: Seen by Dr. Alban Alcantara at [facility]; planning for colonoscopy post-COVID19, and possibly sooner given the improvement but not resolution of the COVID-19 pandemic.    3.  Bilateral shoulder pain: Consistent with impingement  syndrome initially, but now only intermittent and question if a rotator cuff issue.  Not inflammatory in nature.  Discussed option for additional imaging versus starting physical therapy and if needed in the future then additional imaging.  He would like to start physical therapy.  Chronic illness, progressive.    - Physical therapy referral    4.  Lower back pain with right-sided sciatica: Encouraged physical therapy exercises more frequently at home.  Improved when he does physical therapy exercises.    5.  Bilateral hip pain: Worse with activity and improves with rest.  Never lasts for more than a couple hours at a time.    Discussed checking imaging versus getting physical therapy now and he would like to start physical therapy.  Consider imaging in the future if no improvement.  Chronic illness, progressive.   - Physical therapy referral    6. Vitamin D deficiency: Was on erogocalciferol 50,000 units once weekly and never had repeat labs for vitamin D.  Changed to vitamin D 1000 IU daily but he is not taking it regularly.  Vitamin D was low previously.  Start vitamin D 2000 IU daily and recheck labs in 3-4 months  - Continue vitamin D 1000 IU daily  - Labs in 3-4 months: Vitamin D    7.  Vaccinations: Vaccinations reviewed with Mr. Richards.  Risks and benefits of vaccinations were discussed.    - Influenza: encouraged yearly vaccination  - [id number]: To receive today  - [id number]: advised receiving at least 8 weeks after [id number] is administered  - Shingrix: Advised receiving; patient is going to check on insurance coverage before determining if it is going to be received   - COVID-19: has received the Pfizer COVID-19 vaccine on 3/17/2021 and 4/7/2021.   A third dose of an mRNA COVID-19 vaccination is recommended to receive 28 days after the second mRNA COVID-19 vaccination was received; this 3rd dose should be from the same  as the first two doses, and will complete the initial vaccine series.    A 4th dose (1st booster) of the mRNA COVID-19 vaccination is recommended to be received 3 months after the third mRNA COVID-19 vaccination was received.  A 5th mRNA vaccine (2nd booster) may be received at least 4 months after the 4th dose.       Total minutes spent in evaluation with patient, documentation, , and review of pertinent studies and chart notes: 23    Mr. Richards verbalized agreement with and understanding of the rational for the diagnosis and treatment plan.  All questions were answered to best of my ability and the patient's satisfaction. Mr. Richards was advised to contact the clinic with any questions that may arise after the clinic visit.      Thank you for involving me in the care of the patient    Return to clinic: 4 months      HPI     Maverick Richards is a 41 year old male who presents for follow-up of spondyloarthropathy    1/30/2020: Mr. Richards reported 5 years of right wrist pain, worse with certain movements that will cause pain for 1-7days that gradually improves and then another thing will trigger it again; was a period of a doing okay with a couple months then he openned the refrigerator door and it happened again; doesn't swell; maybe increased warmth; sharp shooting pain; pain will radiate to the dorsal right 2nd-4th fingers and sometimes to the volar forearm just proximal to the right wrist.  L>R shoulder pain with certain positions and he demonstrates pain with abduction. R>L MCPs>PIPs>DIPs hurt; more pain in the AM, repetitive fine motor movement at work that worsens his pain; when doing heavy lifting prior to this job he had similar symptoms and would need to adjust how he lifted things. Elbows hurt sometimes. Hips are okay.  Knees hurt at the end of the day; improve with rest; no swelling.  Ankles are worse with activity; improve with rest; long history of spraining ankles over and over again; says that he has been eval'd several times including orthopedics and has been  told to rest, splint sometimes with ACE bandages, and ice.  Toes are okay.  Morning stiffness for 1 hour, better with a hot shower; +gelling.  He then says that while he is active during the day he feels so much better, and if he sits down to rest then he feels terrible because it is so hard to get going again.  He says that when he gets home he has to get everything done right away because if he sits to relax then it is hard to get up again.     5/6/2020: no change in symptoms.  Didn't start Humira because of anticipation that it may change the colonoscopy results; wasn't concerned about COVID19 related issues.  Still with pain and stiffness and plans to start Humira for these symptoms as he doesn't know when the colonoscopy will be able to be done.     9/25/2020: Still with bloody stools intermittently but not for the last few weeks.  Pain at his MCPs more than the PIPs but also involving the DIPs that is worse in the morning and improves with time and activity.  Too much fine motor movement also bothers his hands.  Morning stiffness for at least 1 hour but is better with a hot shower.  Positive gelling phenomenon.  Lower back stiffness and pain is worse in the morning and improves with time and activity.  Had a couple times of sharp lower back pain that radiated to his right leg and he went to a chiropractor but does not want to keep going to a chiropractor he says.  Continues on high-dose vitamin D but is now out.  Left shoulder pain with abduction above 90 degrees; range of motion intact per patient.      7/9/2021: First time evaluated since he started Humira.  He says that when he was on Humira consistently he felt much better.  Now taking Humira inconsistently and feels worse, with worsening morning stiffness and pain in his hands and back.  Pain and stiffness improves with time and activity.  Not having any bloody stools.    11/5/2021: Currently doing well with Humira he says.  Tolerating Humira well.   However, still has 1-2 hours of morning stiffness affecting his lower back, and ache in his lower back and is worse in the morning improves with time and activity.  Bowel symptoms have improved; no constipation or diarrhea no blood in his stool.  Has not had a colonoscopy.    Today, 5/5/2022: Tolerating Humira well and it is effective.  Morning stiffness for no more than 20 minutes, if present at all.  No joint swelling.  No rash.  No eye irritation.  No black or bloody stools.  No constipation or diarrhea.  Sometimes with external rotation of the hip he will have hip pain that may last for a couple hours, does not radiate, and generally resolve spontaneously.  Bilateral shoulder pain occasionally occurs with different movements; also last for no more than a couple hours at a time and does not occur every day.  Has been to a chiropractor for his hips and shoulders and this has been helpful.  Not doing stretching exercises.    Denies fevers, chills, nausea, vomiting, constipation, diarrhea. No abdominal pain.   No chest pain/pressure, palpitations, or shortness of breath. No LE swelling. No neck pain. No oral or nasal sores.  No rash. No sicca symptoms.  No eye irritation or pain.    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 No pertinent past medical history.  Past Surgical History     Past Surgical History:   Procedure Laterality Date     ENT SURGERY      tonsilectomy age 1     Allergy   No Known Allergies  Current Medication List     Current Outpatient Medications   Medication Sig     adalimumab (HUMIRA *CF*) 40 MG/0.4ML pen kit Inject 0.4 mLs (40 mg)  "Subcutaneous every 14 days . Hold for signs of infection, then seek medical attention.     ketoconazole (NIZORAL) 2 % external shampoo Use small amount as shampoo and face/body wash every other day.     medical cannabis (Patient's own supply) See Admin Instructions (The purpose of this order is to document that the patient reports taking medical cannabis.  This is not a prescription, and is not used to certify that the patient has a qualifying medical condition.)     minocycline (MINOCIN) 100 MG capsule Take 1 capsule (100 mg) by mouth 2 times daily     triamcinolone (KENALOG) 0.1 % external cream Apply a thin layer twice daily to affected areas as needed.     Vitamin D, Cholecalciferol, 25 MCG (1000 UT) CAPS Take 1,000 Units by mouth daily     No current facility-administered medications for this visit.         Social History   See HPI    Family History   No known family history of rheumatologic disorder    Physical Exam     Temp Readings from Last 3 Encounters:   04/21/21 98.3  F (36.8  C) (Oral)   07/31/19 97.4  F (36.3  C) (Oral)   10/06/14 97.3  F (36.3  C) (Oral)     BP Readings from Last 5 Encounters:   05/05/22 128/83   11/05/21 120/80   04/21/21 112/73   02/05/20 128/80   01/30/20 115/68     Pulse Readings from Last 1 Encounters:   05/05/22 63     Resp Readings from Last 1 Encounters:   No data found for Resp     Estimated body mass index is 30.99 kg/m  as calculated from the following:    Height as of 11/5/21: 1.676 m (5' 6\").    Weight as of this encounter: 87.1 kg (192 lb).      GEN: NAD.    HEENT:  Anicteric, noninjected sclera. No obvious external lesions of the ear and nose. Hearing intact.  CV: S1, S2. RRR. No m/r/g  PULM: No increased work of breathing. CTA bilaterally   MSK: MCPs, PIPs, DIPs without swelling or tenderness to palpation.  Wrists without swelling or tenderness to palpation.  Elbows and shoulders without swelling or tenderness to palpation.  Shoulders with normal range of motion.  Hips " nontender to palpation.  Hips with normal internal and external rotation; negative straight leg test.  Spine nontender to palpation.  Nontender to palpation over the SI joints.  Knees, ankles, and MTPs without swelling or tenderness to palpation.  Achilles tendons nontender to palpation.  SKIN: No rash or jaundice seen  PSYCH: Alert. Appropriate.        Labs / Imaging (select studies)     RF/CCP  Recent Labs   Lab Test 01/30/20  0950   CCPIGG 1   RHF <20     CBC  Recent Labs   Lab Test 11/05/21  0836 02/10/21  1630 01/30/20  0950   WBC 7.4 7.7 6.5   RBC 4.70 4.95 5.00   HGB 13.7 14.2 14.3   HCT 42.0 44.3 43.6   MCV 89 90 87   RDW 13.6 13.6 13.1    296 265   MCH 29.1 28.7 28.6   MCHC 32.6 32.1 32.8   NEUTROPHIL 47 40.1 40.6   LYMPH 42 48.7 48.7   MONOCYTE 8 8.0 7.7   EOSINOPHIL 3 2.7 2.5   BASOPHIL 0 0.5 0.5   ANEU  --  3.1 2.6   ALYM  --  3.8 3.2   MASTER  --  0.6 0.5   AEOS  --  0.2 0.2   ABAS  --  0.0 0.0   ANEUTAUTO 3.4  --   --    ALYMPAUTO 3.1  --   --    AMONOAUTO 0.6  --   --    AEOSAUTO 0.2  --   --    ABSBASO 0.0  --   --      CMP  Recent Labs   Lab Test 11/05/21  0836 02/10/21  1630 01/30/20  0950 10/06/14  1009   NA  --   --   --  136   POTASSIUM  --   --   --  4.1   CHLORIDE  --   --   --  103   CO2  --   --   --  26   ANIONGAP  --   --   --  7   GLC  --   --   --  80   BUN  --   --   --  10   CR 0.95 0.95 0.90 0.95   GFRESTIMATED >90 >90 >90 >90  Non African American GFR Calc     GFRESTBLACK  --  >90 >90 >90  African American GFR Calc     KALEY  --   --   --  9.6   BILITOTAL 0.5 0.2 0.5 0.3   ALBUMIN 3.8 4.1 3.9 4.1   PROTTOTAL 7.0 7.3 7.5 8.0   ALKPHOS 61 70 64 75   AST 26 26 22 23   ALT 42 38 37 40     Calcium/VitaminD  Recent Labs   Lab Test 02/10/21  1630 01/30/20  0950 10/06/14  1009   KALEY  --   --  9.6   VITDT 21 10*  --      ESR/CRP  Recent Labs   Lab Test 11/05/21  0836 02/10/21  1630 01/30/20  0950   SED 7 5 5   CRP <2.9 <2.9 3.7     Lipid Panel  No results for input(s): CHOL, TRIG, HDL,  LDL, VLDL, CHOLHDLRATIO, NHDL in the last 89602 hours.  Hepatitis B  Recent Labs   Lab Test 01/30/20  0950   HBCAB Nonreactive   HEPBANG Nonreactive     Hepatitis C  Recent Labs   Lab Test 01/30/20  0950   HCVAB Nonreactive     Lyme ab screening  Recent Labs   Lab Test 01/30/20  0950   LYMEGM 0.19     Tuberculosis Screening  Recent Labs   Lab Test 11/05/21  0836 02/05/20  1545   TBRES Negative Negative       Recent Results (from the past 744 hour(s))   XR Sacroiliac Joint 1/2 Views    Narrative    SACROILIAC JOINT ONE TO TWO VIEWS   1/30/2020 10:36 AM     HISTORY:  Sacroiliitis question. Multiple joint pain.      Impression    IMPRESSION: I suspect mild right sacroiliitis versus degenerative  change.    MARIAN MARSHALL MD   XR Chest 2 Views    Narrative    XR CHEST 2 VW   1/30/2020 10:36 AM    INDICATION: Multiple joint pain.    COMPARISON: No pertinent comparison study is available for review.    FINDINGS:   No focal pulmonary infiltrate, pleural effusion, or pneumothorax. The  cardiac size and mediastinal contours appear within normal limits.     No acute fracture or dislocation. No discrete osseous lesion or  radiopaque foreign body.     CONCLUSION:  No acute abnormality.    ELIZABETH KAT MD   XR Foot Bilateral G/E 3 Views    Narrative    FOOT BILATERAL THREE OR MORE VIEWS   1/30/2020 11:29 AM     HISTORY:  Polyarticular joint pain; evaluating for inflammatory  arthritis or other etiology to explain symptoms. Multiple joint pain.      Impression    IMPRESSION: Unremarkable exam.    MARIAN MARSHALL MD   XR Hand Bilateral G/E 3 Views    Narrative    HAND BILATERAL THREE OR MORE VIEWS 1/30/2020 11:29 AM     HISTORY: Polyarticular joint pain. Evaluating for inflammatory  arthritis or other etiology to explain symptoms. Multiple joint pain.      Impression    IMPRESSION:   1. Left: Positive ulnar variance. 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  2. Right: Unremarkable.    MARIAN MARSHALL MD         Immunization History      Immunization History   Administered Date(s) Administered     COVID-19,PF,Pfizer (12+ Yrs) 03/17/2021, 04/07/2021     Td (Adult), Adsorbed 04/19/1999     Tdap (Adacel,Boostrix) 11/05/2021          Chart documentation done in part with Dragon Voice recognition Software. Although reviewed after completion, some word and grammatical error may remain.        Miguel Huerta MD

## 2022-05-05 NOTE — NURSING NOTE
RAPID3 (0-30) Cumulative Score  7.8          RAPID3 Weighted Score (divide #4 by 3 and that is the weighted score)  2.6

## 2022-06-26 ENCOUNTER — HEALTH MAINTENANCE LETTER (OUTPATIENT)
Age: 42
End: 2022-06-26

## 2022-07-11 ENCOUNTER — TELEPHONE (OUTPATIENT)
Dept: RHEUMATOLOGY | Facility: CLINIC | Age: 42
End: 2022-07-11

## 2022-07-11 NOTE — TELEPHONE ENCOUNTER
PA Initiation    Medication: [facility]  Insurance Company: [employer] [place] - Phone [phone] Fax [phone]  Start Date: 7/11/2022

## 2022-07-12 NOTE — TELEPHONE ENCOUNTER
Prior Authorization Approval    Authorization Effective Date: 7/11/2022  Authorization Expiration Date: 7/11/2023  Medication: HUMIRA- PA RENEWAL Approved  Approved Dose/Quantity: 2 pens per 28 days   Reference #: [id number]   Insurance Company: [employer] [place] - Phone [phone] Fax [phone]  Expected CoPay: $1993.21  $0 after copay card     CoPay Card Available: Yes    Foundation Assistance Needed:    Which Pharmacy is filling the prescription (Not needed for infusion/clinic administered): [facility] - [place], [place] - [address]   Pharmacy Notified: Yes  Patient Notified: Yes

## 2022-09-06 ENCOUNTER — LAB (OUTPATIENT)
Dept: LAB | Facility: CLINIC | Age: 42
End: 2022-09-06
Payer: COMMERCIAL

## 2022-09-06 DIAGNOSIS — M46.90 AXIAL SPONDYLOARTHRITIS (H): ICD-10-CM

## 2022-09-06 DIAGNOSIS — M46.1 SACROILIITIS (H): ICD-10-CM

## 2022-09-06 DIAGNOSIS — E55.9 VITAMIN D DEFICIENCY: ICD-10-CM

## 2022-09-06 LAB
ALBUMIN SERPL-MCNC: 3.9 G/DL (ref 3.4–5)
ALP SERPL-CCNC: 58 U/L (ref 40–150)
ALT SERPL W P-5'-P-CCNC: 25 U/L (ref 0–70)
AST SERPL W P-5'-P-CCNC: 19 U/L (ref 0–45)
BASOPHILS # BLD AUTO: 0 10E3/UL (ref 0–0.2)
BASOPHILS NFR BLD AUTO: 1 %
BILIRUB DIRECT SERPL-MCNC: 0.1 MG/DL (ref 0–0.2)
BILIRUB SERPL-MCNC: 0.4 MG/DL (ref 0.2–1.3)
CREAT SERPL-MCNC: 0.95 MG/DL (ref 0.66–1.25)
CRP SERPL-MCNC: <2.9 MG/L (ref 0–8)
EOSINOPHIL # BLD AUTO: 0.2 10E3/UL (ref 0–0.7)
EOSINOPHIL NFR BLD AUTO: 3 %
ERYTHROCYTE [DISTWIDTH] IN BLOOD BY AUTOMATED COUNT: 12.8 % (ref 10–15)
ERYTHROCYTE [SEDIMENTATION RATE] IN BLOOD BY WESTERGREN METHOD: 8 MM/HR (ref 0–15)
GFR SERPL CREATININE-BSD FRML MDRD: >90 ML/MIN/1.73M2
HCT VFR BLD AUTO: 43.5 % (ref 40–53)
HGB BLD-MCNC: 14.3 G/DL (ref 13.3–17.7)
IMM GRANULOCYTES # BLD: 0 10E3/UL
IMM GRANULOCYTES NFR BLD: 0 %
LYMPHOCYTES # BLD AUTO: 3.4 10E3/UL (ref 0.8–5.3)
LYMPHOCYTES NFR BLD AUTO: 52 %
MCH RBC QN AUTO: 29.9 PG (ref 26.5–33)
MCHC RBC AUTO-ENTMCNC: 32.9 G/DL (ref 31.5–36.5)
MCV RBC AUTO: 91 FL (ref 78–100)
MONOCYTES # BLD AUTO: 0.5 10E3/UL (ref 0–1.3)
MONOCYTES NFR BLD AUTO: 9 %
NEUTROPHILS # BLD AUTO: 2.2 10E3/UL (ref 1.6–8.3)
NEUTROPHILS NFR BLD AUTO: 35 %
NRBC # BLD AUTO: 0 10E3/UL
NRBC BLD AUTO-RTO: 0 /100
PLATELET # BLD AUTO: 270 10E3/UL (ref 150–450)
PROT SERPL-MCNC: 7.3 G/DL (ref 6.8–8.8)
RBC # BLD AUTO: 4.78 10E6/UL (ref 4.4–5.9)
WBC # BLD AUTO: 6.3 10E3/UL (ref 4–11)

## 2022-09-06 PROCEDURE — 82306 VITAMIN D 25 HYDROXY: CPT

## 2022-09-06 PROCEDURE — 80076 HEPATIC FUNCTION PANEL: CPT

## 2022-09-06 PROCEDURE — 82565 ASSAY OF CREATININE: CPT

## 2022-09-06 PROCEDURE — 85025 COMPLETE CBC W/AUTO DIFF WBC: CPT

## 2022-09-06 PROCEDURE — 86140 C-REACTIVE PROTEIN: CPT

## 2022-09-06 PROCEDURE — 85652 RBC SED RATE AUTOMATED: CPT

## 2022-09-06 PROCEDURE — 36415 COLL VENOUS BLD VENIPUNCTURE: CPT

## 2022-09-07 LAB — DEPRECATED CALCIDIOL+CALCIFEROL SERPL-MC: 26 UG/L (ref 20–75)

## 2022-09-08 ENCOUNTER — OFFICE VISIT (OUTPATIENT)
Dept: RHEUMATOLOGY | Facility: CLINIC | Age: 42
End: 2022-09-08
Payer: COMMERCIAL

## 2022-09-08 VITALS
DIASTOLIC BLOOD PRESSURE: 81 MMHG | WEIGHT: 194.8 LBS | OXYGEN SATURATION: 99 % | SYSTOLIC BLOOD PRESSURE: 126 MMHG | HEART RATE: 72 BPM | BODY MASS INDEX: 31.44 KG/M2

## 2022-09-08 DIAGNOSIS — M46.90 AXIAL SPONDYLOARTHRITIS (H): Primary | ICD-10-CM

## 2022-09-08 DIAGNOSIS — M25.551 BILATERAL HIP PAIN: ICD-10-CM

## 2022-09-08 DIAGNOSIS — Z23 NEED FOR VACCINATION: ICD-10-CM

## 2022-09-08 DIAGNOSIS — M46.1 SACROILIITIS (H): ICD-10-CM

## 2022-09-08 DIAGNOSIS — M25.552 BILATERAL HIP PAIN: ICD-10-CM

## 2022-09-08 PROCEDURE — 90471 IMMUNIZATION ADMIN: CPT | Performed by: INTERNAL MEDICINE

## 2022-09-08 PROCEDURE — 99214 OFFICE O/P EST MOD 30 MIN: CPT | Mod: 25 | Performed by: INTERNAL MEDICINE

## 2022-09-08 PROCEDURE — 90732 PPSV23 VACC 2 YRS+ SUBQ/IM: CPT | Performed by: INTERNAL MEDICINE

## 2022-09-08 NOTE — PROGRESS NOTES
Rheumatology Clinic Visit      Maverick Richards MRN# [id number]   YOB: 1980 Age: 42 year old      Date of visit: 9/08/22   PCP: Balbina Alva    Chief Complaint   Patient presents with:  Axial spondyloarthritis    Assessment and Plan     1.  Spondyloarthropathy, axial and peripheral involvement: Establish care with me 2/5/2020 at which point he had R>L MCP, PIP, DIP joint involvement that is inflammatory in nature.  Ankle and knee pain is degenerative in nature.  Axial symptoms had mixed inflammatory and degenerative symptoms; right sacroiliitis suspected based on x-rays.  Additionally, he has a history of bright red blood and black stools 1-2 times per month starting in 2018 and this in the setting of inflammatory arthritis raises the concern for an inflammatory bowel disease so he has been previously referred to gastroenterology and he is planning to have a colonoscopy post-COVID19 pandemic.  At the visit on 9/25/2020 he had not yet started Humira but he continued to have inflammatory joint symptoms.  Humira was then started prior to having any GI work-up and he has felt much better, worse when he was not on Humira when he was lost to follow-up.  Inflammatory joint symptoms are well controlled at this time.  Minimal morning stiffness and no joint swelling.  Degenerative hip and shoulder pain as noted below.  Continue Humira monotherapy.   Chronic illness, stable.    - Continue Humira 40mg SQ every 14 days   - Advised stretching exercises daily, with programs such as yoga  - No rheumatology labs needed prior to 4-month rheumatology follow-up appointment    2.  Intermittent bright red blood and black stools, reportedly 1-2 times per month for a couple years: Seen by Dr. Alban Alcantara at [facility]; planning for colonoscopy post-COVID19, and possibly sooner given the improvement but not resolution of the COVID-19 pandemic.  Advised that at this point it would be beneficial to have the colonoscopy  performed.    3.  Bilateral shoulder pain: Consistent with impingement syndrome initially, but now only intermittent and question if a rotator cuff issue.  Not inflammatory in nature.  Discussed option for additional imaging versus starting physical therapy and if needed in the future then additional imaging.  He would like to start physical therapy and referral was given previously but he has not yet gone.  Advised that he call to schedule with physical therapy.  Chronic illness, progressive.    - Physical therapy referral reprinted for the patient    4.  Lower back pain with right-sided sciatica: Encouraged physical therapy exercises more frequently at home.  Improved when he does physical therapy exercises.    5.  Bilateral hip pain: Worse with activity and improves with rest.  Never lasts for more than a couple hours at a time.    Discussed checking imaging versus getting physical therapy now and he would like to start physical therapy and referral was given previously but he has not yet gone.  Advised that he call to schedule with physical therapy.  Consider imaging in the future if no improvement with PT exercises.  May use naproxen 220 mg - 440 mg 1-2 times daily as needed for up to 1 week if hip pain is significant.  Advised that he keep a journal of flares that he says occurs approximately once every 1-2 months.  Chronic illness, progressive.   - Physical therapy referral reprinted for the patient    6. Vitamin D deficiency: Previously used ergocalciferol 50,000 units once weekly; now on vitamin D 2000 IU daily.   - Continue vitamin D 2000 IU daily    7.  Vaccinations: Vaccinations reviewed with Mr. Richards.  Risks and benefits of vaccinations were discussed.    - Influenza: encouraged yearly vaccination  - [id number]: Up to date  - [id number]: To receive today  - COVID-19: Advised receiving the updated COVID-19 vaccination.     Total minutes spent in evaluation with patient, documentation, , and  review of pertinent studies and chart notes: 15    Mr. Richards verbalized agreement with and understanding of the rational for the diagnosis and treatment plan.  All questions were answered to best of my ability and the patient's satisfaction. Mr. Richards was advised to contact the clinic with any questions that may arise after the clinic visit.      Thank you for involving me in the care of the patient    Return to clinic: 4 months      HPI     Maverick Richards is a 42 year old male who presents for follow-up of spondyloarthropathy    1/30/2020: Mr. Richards reported 5 years of right wrist pain, worse with certain movements that will cause pain for 1-7days that gradually improves and then another thing will trigger it again; was a period of a doing okay with a couple months then he openned the refrigerator door and it happened again; doesn't swell; maybe increased warmth; sharp shooting pain; pain will radiate to the dorsal right 2nd-4th fingers and sometimes to the volar forearm just proximal to the right wrist.  L>R shoulder pain with certain positions and he demonstrates pain with abduction. R>L MCPs>PIPs>DIPs hurt; more pain in the AM, repetitive fine motor movement at work that worsens his pain; when doing heavy lifting prior to this job he had similar symptoms and would need to adjust how he lifted things. Elbows hurt sometimes. Hips are okay.  Knees hurt at the end of the day; improve with rest; no swelling.  Ankles are worse with activity; improve with rest; long history of spraining ankles over and over again; says that he has been eval'd several times including orthopedics and has been told to rest, splint sometimes with ACE bandages, and ice.  Toes are okay.  Morning stiffness for 1 hour, better with a hot shower; +gelling.  He then says that while he is active during the day he feels so much better, and if he sits down to rest then he feels terrible because it is so hard to get going again.  He says that  when he gets home he has to get everything done right away because if he sits to relax then it is hard to get up again.     5/6/2020: no change in symptoms.  Didn't start Humira because of anticipation that it may change the colonoscopy results; wasn't concerned about COVID19 related issues.  Still with pain and stiffness and plans to start Humira for these symptoms as he doesn't know when the colonoscopy will be able to be done.     9/25/2020: Still with bloody stools intermittently but not for the last few weeks.  Pain at his MCPs more than the PIPs but also involving the DIPs that is worse in the morning and improves with time and activity.  Too much fine motor movement also bothers his hands.  Morning stiffness for at least 1 hour but is better with a hot shower.  Positive gelling phenomenon.  Lower back stiffness and pain is worse in the morning and improves with time and activity.  Had a couple times of sharp lower back pain that radiated to his right leg and he went to a chiropractor but does not want to keep going to a chiropractor he says.  Continues on high-dose vitamin D but is now out.  Left shoulder pain with abduction above 90 degrees; range of motion intact per patient.      7/9/2021: First time evaluated since he started Humira.  He says that when he was on Humira consistently he felt much better.  Now taking Humira inconsistently and feels worse, with worsening morning stiffness and pain in his hands and back.  Pain and stiffness improves with time and activity.  Not having any bloody stools.    11/5/2021: Currently doing well with Humira he says.  Tolerating Humira well.  However, still has 1-2 hours of morning stiffness affecting his lower back, and ache in his lower back and is worse in the morning improves with time and activity.  Bowel symptoms have improved; no constipation or diarrhea no blood in his stool.  Has not had a colonoscopy.    5/5/2022: Tolerating Humira well and it is effective.   Morning stiffness for no more than 20 minutes, if present at all.  No joint swelling.  No rash.  No eye irritation.  No black or bloody stools.  No constipation or diarrhea.  Sometimes with external rotation of the hip he will have hip pain that may last for a couple hours, does not radiate, and generally resolve spontaneously.  Bilateral shoulder pain occasionally occurs with different movements; also last for no more than a couple hours at a time and does not occur every day.  Has been to a chiropractor for his hips and shoulders and this has been helpful.  Not doing stretching exercises.    Today, 9/8/2022: Does well most of the time, except for having flares approximately once every 2 months affecting his hips where he may be doing something such as getting out of the car and suddenly have sharp pain in the hip that is nonradiating and not clear if it is associated with other joint involvement.  He reports though that often the hip pain is bilateral when it occurs.  Hip symptoms typically improve quickly, but do not resolve until 1-7 days after the initial sharp pain.  Did not go to physical therapy yet but would like to do so.  No black or bloody stool.  No abdominal pain.  No eye pain or redness.    Denies fevers, chills, nausea, vomiting, constipation, diarrhea. No abdominal pain.   No chest pain/pressure, palpitations, or shortness of breath. No LE swelling. No neck pain. No oral or nasal sores.  No rash. No sicca symptoms.      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 No pertinent past medical history.  Past Surgical History     Past Surgical History:  "  Procedure Laterality Date     ENT SURGERY      tonsilectomy age 1     Allergy   No Known Allergies  Current Medication List     Current Outpatient Medications   Medication Sig     adalimumab (HUMIRA *CF*) 40 MG/0.4ML pen kit Inject 0.4 mLs (40 mg) Subcutaneous every 14 days . Hold for signs of infection, then seek medical attention.     ketoconazole (NIZORAL) 2 % external shampoo Use small amount as shampoo and face/body wash every other day.     medical cannabis (Patient's own supply) See Admin Instructions (The purpose of this order is to document that the patient reports taking medical cannabis.  This is not a prescription, and is not used to certify that the patient has a qualifying medical condition.)     minocycline (MINOCIN) 100 MG capsule Take 1 capsule (100 mg) by mouth 2 times daily     triamcinolone (KENALOG) 0.1 % external cream Apply a thin layer twice daily to affected areas as needed.     vitamin D3 (CHOLECALCIFEROL) 50 mcg (2000 units) tablet Take 1 tablet (50 mcg) by mouth daily     No current facility-administered medications for this visit.         Social History   See HPI    Family History   No known family history of rheumatologic disorder    Physical Exam     Temp Readings from Last 3 Encounters:   04/21/21 98.3  F (36.8  C) (Oral)   07/31/19 97.4  F (36.3  C) (Oral)   10/06/14 97.3  F (36.3  C) (Oral)     BP Readings from Last 5 Encounters:   09/08/22 126/81   05/05/22 128/83   11/05/21 120/80   04/21/21 112/73   02/05/20 128/80     Pulse Readings from Last 1 Encounters:   09/08/22 72     Resp Readings from Last 1 Encounters:   No data found for Resp     Estimated body mass index is 31.44 kg/m  as calculated from the following:    Height as of 11/5/21: 1.676 m (5' 6\").    Weight as of this encounter: 88.4 kg (194 lb 12.8 oz).    GEN: NAD.    HEENT:  Anicteric, noninjected sclera. No obvious external lesions of the ear and nose. Hearing intact.  PULM: No increased work of breathing.   MSK: " MCPs, PIPs, DIPs without swelling or tenderness to palpation.  Wrists without swelling or tenderness to palpation.  Elbows and shoulders without swelling or tenderness to palpation.  Shoulders with normal range of motion.  Hips nontender to palpation.  Hips with normal internal and external rotation; negative straight leg test.  Spine nontender to palpation.  Nontender to palpation over the SI joints.  Knees and ankles without swelling or tenderness to palpation.  Negative MTP squeeze.  Achilles tendons nontender to palpation.  SKIN: No rash or jaundice seen  PSYCH: Alert. Appropriate.      Labs / Imaging (select studies)   RF/CCP  Recent Labs   Lab Test 01/30/20  0950   CCPIGG 1   RHF <20     CBC  Recent Labs   Lab Test 09/06/22  0717 11/05/21  0836 02/10/21  1630 01/30/20  0950   WBC 6.3 7.4 7.7 6.5   RBC 4.78 4.70 4.95 5.00   HGB 14.3 13.7 14.2 14.3   HCT 43.5 42.0 44.3 43.6   MCV 91 89 90 87   RDW 12.8 13.6 13.6 13.1    249 296 265   MCH 29.9 29.1 28.7 28.6   MCHC 32.9 32.6 32.1 32.8   NEUTROPHIL 35 47 40.1 40.6   LYMPH 52 42 48.7 48.7   MONOCYTE 9 8 8.0 7.7   EOSINOPHIL 3 3 2.7 2.5   BASOPHIL 1 0 0.5 0.5   ANEU  --   --  3.1 2.6   ALYM  --   --  3.8 3.2   MASTER  --   --  0.6 0.5   AEOS  --   --  0.2 0.2   ABAS  --   --  0.0 0.0   ANEUTAUTO 2.2 3.4  --   --    ALYMPAUTO 3.4 3.1  --   --    AMONOAUTO 0.5 0.6  --   --    AEOSAUTO 0.2 0.2  --   --    ABSBASO 0.0 0.0  --   --      CMP  Recent Labs   Lab Test 09/06/22  0717 11/05/21  0836 02/10/21  1630 01/30/20  0950 10/06/14  1009   NA  --   --   --   --  136   POTASSIUM  --   --   --   --  4.1   CHLORIDE  --   --   --   --  103   CO2  --   --   --   --  26   ANIONGAP  --   --   --   --  7   GLC  --   --   --   --  80   BUN  --   --   --   --  10   CR 0.95 0.95 0.95 0.90 0.95   GFRESTIMATED >90 >90 >90 >90 >90  Non African American GFR Calc     GFRESTBLACK  --   --  >90 >90 >90  African American GFR Calc     KALEY  --   --   --   --  9.6   BILITOTAL 0.4 0.5 0.2  0.5 0.3   ALBUMIN 3.9 3.8 4.1 3.9 4.1   PROTTOTAL 7.3 7.0 7.3 7.5 8.0   ALKPHOS 58 61 70 64 75   AST 19 26 26 22 23   ALT 25 42 38 37 40     Calcium/VitaminD  Recent Labs   Lab Test 09/06/22  0717 02/10/21  1630 01/30/20  0950 10/06/14  1009   KALEY  --   --   --  9.6   VITDT 26 21 10*  --      ESR/CRP  Recent Labs   Lab Test 09/06/22  0717 11/05/21  0836 02/10/21  1630   SED 8 7 5   CRP <2.9 <2.9 <2.9     Hepatitis B  Recent Labs   Lab Test 01/30/20  0950   HBCAB Nonreactive   HEPBANG Nonreactive     Hepatitis C  Recent Labs   Lab Test 01/30/20  0950   HCVAB Nonreactive     Lyme ab screening  Recent Labs   Lab Test 01/30/20  0950   LYMEGM 0.19     Tuberculosis Screening  Recent Labs   Lab Test 11/05/21  0836 02/05/20  1545   TBRES Negative Negative       Recent Results (from the past 744 hour(s))   XR Sacroiliac Joint 1/2 Views    Narrative    SACROILIAC JOINT ONE TO TWO VIEWS   1/30/2020 10:36 AM     HISTORY:  Sacroiliitis question. Multiple joint pain.      Impression    IMPRESSION: I suspect mild right sacroiliitis versus degenerative  change.    MARIAN MARSHALL MD   XR Chest 2 Views    Narrative    XR CHEST 2 VW   1/30/2020 10:36 AM    INDICATION: Multiple joint pain.    COMPARISON: No pertinent comparison study is available for review.    FINDINGS:   No focal pulmonary infiltrate, pleural effusion, or pneumothorax. The  cardiac size and mediastinal contours appear within normal limits.     No acute fracture or dislocation. No discrete osseous lesion or  radiopaque foreign body.     CONCLUSION:  No acute abnormality.    ELIZABETH KAT MD   XR Foot Bilateral G/E 3 Views    Narrative    FOOT BILATERAL THREE OR MORE VIEWS   1/30/2020 11:29 AM     HISTORY:  Polyarticular joint pain; evaluating for inflammatory  arthritis or other etiology to explain symptoms. Multiple joint pain.      Impression    IMPRESSION: Unremarkable exam.    MARIAN MARSHALL MD   XR Hand Bilateral G/E 3 Views    Narrative    HAND BILATERAL THREE OR  MORE VIEWS 1/30/2020 11:29 AM     HISTORY: Polyarticular joint pain. Evaluating for inflammatory  arthritis or other etiology to explain symptoms. Multiple joint pain.      Impression    IMPRESSION:   1. Left: Positive ulnar variance. 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  2. Right: Unremarkable.    MARIAN MARSHALL MD         Immunization History     Immunization History   Administered Date(s) Administered     COVID-19,PF,Pfizer (12+ Yrs) 03/17/2021, 04/07/2021     Pneumo Conj 13-V (2010&after) 05/05/2022     Td (Adult), Adsorbed 04/19/1999     Tdap (Adacel,Boostrix) 11/05/2021          Chart documentation done in part with Dragon Voice recognition Software. Although reviewed after completion, some word and grammatical error may remain.        Miguel Huerta MD

## 2022-09-08 NOTE — PATIENT INSTRUCTIONS
For the bilateral hip pain: may use naproxen 220mg-440mg 1-2 times per day for up to 1 week, as needed.  Start physical therapy. Keep a journal of the flares.

## 2022-09-08 NOTE — NURSING NOTE
RAPID3 (0-30) Cumulative Score  8.5          RAPID3 Weighted Score (divide #4 by 3 and that is the weighted score)  2.8

## 2022-10-26 ENCOUNTER — OFFICE VISIT (OUTPATIENT)
Dept: DERMATOLOGY | Facility: CLINIC | Age: 42
End: 2022-10-26
Payer: COMMERCIAL

## 2022-10-26 VITALS — HEART RATE: 75 BPM | SYSTOLIC BLOOD PRESSURE: 124 MMHG | DIASTOLIC BLOOD PRESSURE: 81 MMHG | TEMPERATURE: 98.2 F

## 2022-10-26 DIAGNOSIS — B36.0 TINEA VERSICOLOR DUE TO MALASSEZIA FURFUR: ICD-10-CM

## 2022-10-26 DIAGNOSIS — L23.2 ALLERGIC CONTACT DERMATITIS DUE TO COSMETICS: Primary | ICD-10-CM

## 2022-10-26 DIAGNOSIS — L21.9 SEBORRHEIC DERMATITIS: ICD-10-CM

## 2022-10-26 DIAGNOSIS — D22.9 MULTIPLE MELANOCYTIC NEVI: ICD-10-CM

## 2022-10-26 DIAGNOSIS — L81.0 POSTINFLAMMATORY HYPERPIGMENTATION: ICD-10-CM

## 2022-10-26 PROCEDURE — 99213 OFFICE O/P EST LOW 20 MIN: CPT | Performed by: DERMATOLOGY

## 2022-10-26 RX ORDER — KETOCONAZOLE 20 MG/ML
SHAMPOO TOPICAL
Qty: 120 ML | Refills: 11 | Status: SHIPPED | OUTPATIENT
Start: 2022-10-26

## 2022-10-26 RX ORDER — PRENATAL VIT 91/IRON/FOLIC/DHA 28-975-200
COMBINATION PACKAGE (EA) ORAL 2 TIMES DAILY
Qty: 42 G | Refills: 3 | Status: SHIPPED | OUTPATIENT
Start: 2022-10-26

## 2022-10-26 RX ORDER — TRIAMCINOLONE ACETONIDE 1 MG/G
OINTMENT TOPICAL 2 TIMES DAILY
Qty: 80 G | Refills: 3 | Status: SHIPPED | OUTPATIENT
Start: 2022-10-26

## 2022-10-26 NOTE — NURSING NOTE
Maverick Richards's goals for this visit include:   Chief Complaint   Patient presents with     Skin Check     Full body skin check. Irritation in armpits off and on. No personal history of skin cancer. Family history of skin cancer in maternal grandfather- unknown type.        He requests these members of his care team be copied on today's visit information:     PCP: Balbina Alva    Referring Provider:  No referring provider defined for this encounter.    /81 (BP Location: Left arm, Patient Position: Sitting, Cuff Size: Adult Regular)   Pulse 75   Temp 98.2  F (36.8  C) (Oral)     Do you need any medication refills at today's visit? Jacki Ordonez CMA

## 2022-10-26 NOTE — LETTER
10/26/2022         RE: Maverick Richards  [address] [place] [address]        Dear Colleague,    Thank you for referring your patient, Maverick Richards, to the [facility]. Please see a copy of my visit note below.    [facility] Dermatology Note  Encounter Date: Oct 26, 2022  Office Visit     Dermatology Problem List:  1. Relevant medical history: Spondyloarthropathy on humira  2. Seb derm, scalp and beard - keto 2% shampoo  3. 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  Family History: History of skin cancer in maternal grandfather, uncertain type.  ____________________________________________     ASSESSMENT/PLAN:     1. Torso eruption - most consistent with tinea versicolor today, although in past has more closely resembled CARP. Chronic, stable. Reviewed treatment options including topical terbinafine cream and daily ketoconazole shampoo. Has some concomitant seborrheic dermatitis as well.  - Apply terbinafine BID to the chest/back.  - Continue ketoconazole 2% shampoo daily for the scalp, face, chest, and back. Refilled.     2. Irritant vs allergic contact dermatitis - axillae. Reviewed treatment options including topical triamcinolone and switching deodorants. Patch testing may be future consideration  - Recommended patch testing if irritation persists.   - Apply triamcinolone 0.1% ointment BID to the armpits.  - Recommended Free and Clear deodorant.    3. Multiple clinically banal-appearing melanocytic nevi: Chronic, stable  - No further intervention required. Patient to report changes.   - Patient reassured of the benign nature of these lesions.      Procedures Performed:   None.      Follow-up: 3 month(s) in-person for follow up, or earlier for new or changing lesions    Staff and Scribe:     Scribe  "Disclosure:   I, Álvaro Figueroa, am serving as a scribe to document services personally performed by this physician, Dr. Connor Banda, based on data collection and the provider's statements to me.       Provider Disclosure:   The documentation recorded by the scribe accurately reflects the services I personally performed and the decisions made by me.    Connor Banda MD   of Dermatology  Department of Dermatology  [facility]      ____________________________________________    CC: Skin Check (Full body skin check. Irritation in armpits off and on. No personal history of skin cancer. Family history of skin cancer in maternal grandfather- unknown type. )    HPI:  Mr. Maverick Richards is a(n) 42 year old male who presents today as a return patient for a skin check.    Last seen 9/23/21 by Dr. Wade for a rash. At that time, patient was instructed to continue ketoconazole 2% shampoo for maintenance and continue triamcinolone 0.1% cream twice daily as needed for flares.    Today, he notes that he gets irritation in the armpits occasionally for a ling time. He feels like the skin gets irritated and \"open\" and stops using deodorant for some time. He notes that many other deodorants cause irritation, and his Old Spice deodorant hadn't previously caused irritation for over 1 year.     Patient is otherwise feeling well, without additional skin concerns.    Labs Reviewed:  N/A    Physical Exam:  Vitals: There were no vitals taken for this visit.  SKIN: Total skin excluding the undergarment areas was performed. The exam included the head/face, neck, both arms, chest, back, abdomen, both legs, digits and/or nails.   - Sharply demarcated erythematous thin plaques confined to axillary vaults.  - Numerous ill defined hyperpigmented patches with scant overlying scale on the chest and back.  - There are dome shaped bright red papules on the trunk and extremities.   - " Multiple regular brown pigmented macules and papules are identified on the trunk and extremities.   - Scattered brown macules on sun exposed areas.  - There are waxy stuck on tan to brown papules on the trunk and extremities.    - No other lesions of concern on areas examined.     Medications:  Current Outpatient Medications   Medication     adalimumab (HUMIRA *CF*) 40 MG/0.4ML pen kit     ketoconazole (NIZORAL) 2 % external shampoo     medical cannabis (Patient's own supply)     minocycline (MINOCIN) 100 MG capsule     triamcinolone (KENALOG) 0.1 % external cream     vitamin D3 (CHOLECALCIFEROL) 50 mcg (2000 units) tablet     No current facility-administered medications for this visit.      Past Medical History:   Patient Active Problem List   Diagnosis     CARDIOVASCULAR SCREENING; LDL GOAL LESS THAN 160     Spondyloarthropathy     No past medical history on file.     CC No referring provider defined for this encounter. on close of this encounter.      Again, thank you for allowing me to participate in the care of your patient.        Sincerely,        Connor Banda MD

## 2022-10-26 NOTE — PROGRESS NOTES
[facility] Dermatology Note  Encounter Date: Oct 26, 2022  Office Visit     Dermatology Problem List:  1. Relevant medical history: Spondyloarthropathy on humira  2. Seb derm, scalp and beard - keto 2% shampoo  3. 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  Family History: History of skin cancer in maternal grandfather, uncertain type.  ____________________________________________     ASSESSMENT/PLAN:     1. Torso eruption - most consistent with tinea versicolor today, although in past has more closely resembled CARP. Chronic, stable. Reviewed treatment options including topical terbinafine cream and daily ketoconazole shampoo. Has some concomitant seborrheic dermatitis as well.  - Apply terbinafine BID to the chest/back.  - Continue ketoconazole 2% shampoo daily for the scalp, face, chest, and back. Refilled.     2. Irritant vs allergic contact dermatitis - axillae. Reviewed treatment options including topical triamcinolone and switching deodorants. Patch testing may be future consideration  - Recommended patch testing if irritation persists.   - Apply triamcinolone 0.1% ointment BID to the armpits.  - Recommended Free and Clear deodorant.    3. Multiple clinically banal-appearing melanocytic nevi: Chronic, stable  - No further intervention required. Patient to report changes.   - Patient reassured of the benign nature of these lesions.      Procedures Performed:   None.      Follow-up: 3 month(s) in-person for follow up, or earlier for new or changing lesions    Staff and Scribe:     Scribe Disclosure:   Álvaro LILLY, am serving as a scribe to document services personally performed by this physician, Dr. Connor Banda, based on data collection and the provider's statements to me.       Provider Disclosure:   The documentation recorded by  "the scribe accurately reflects the services I personally performed and the decisions made by me.    Connor Banda MD   of Dermatology  Department of Dermatology  [facility]      ____________________________________________    CC: Skin Check (Full body skin check. Irritation in armpits off and on. No personal history of skin cancer. Family history of skin cancer in maternal grandfather- unknown type. )    HPI:  Mr. Maverick Richards is a(n) 42 year old male who presents today as a return patient for a skin check.    Last seen 9/23/21 by Dr. Wade for a rash. At that time, patient was instructed to continue ketoconazole 2% shampoo for maintenance and continue triamcinolone 0.1% cream twice daily as needed for flares.    Today, he notes that he gets irritation in the armpits occasionally for a ling time. He feels like the skin gets irritated and \"open\" and stops using deodorant for some time. He notes that many other deodorants cause irritation, and his Old Spice deodorant hadn't previously caused irritation for over 1 year.     Patient is otherwise feeling well, without additional skin concerns.    Labs Reviewed:  N/A    Physical Exam:  Vitals: There were no vitals taken for this visit.  SKIN: Total skin excluding the undergarment areas was performed. The exam included the head/face, neck, both arms, chest, back, abdomen, both legs, digits and/or nails.   - Sharply demarcated erythematous thin plaques confined to axillary vaults.  - Numerous ill defined hyperpigmented patches with scant overlying scale on the chest and back.  - There are dome shaped bright red papules on the trunk and extremities.   - Multiple regular brown pigmented macules and papules are identified on the trunk and extremities.   - Scattered brown macules on sun exposed areas.  - There are waxy stuck on tan to brown papules on the trunk and extremities.    - No other lesions of concern on " areas examined.     Medications:  Current Outpatient Medications   Medication     adalimumab (HUMIRA *CF*) 40 MG/0.4ML pen kit     ketoconazole (NIZORAL) 2 % external shampoo     medical cannabis (Patient's own supply)     minocycline (MINOCIN) 100 MG capsule     triamcinolone (KENALOG) 0.1 % external cream     vitamin D3 (CHOLECALCIFEROL) 50 mcg (2000 units) tablet     No current facility-administered medications for this visit.      Past Medical History:   Patient Active Problem List   Diagnosis     CARDIOVASCULAR SCREENING; LDL GOAL LESS THAN 160     Spondyloarthropathy     No past medical history on file.     CC No referring provider defined for this encounter. on close of this encounter.

## 2022-11-21 ENCOUNTER — HEALTH MAINTENANCE LETTER (OUTPATIENT)
Age: 42
End: 2022-11-21

## 2023-01-19 ENCOUNTER — OFFICE VISIT (OUTPATIENT)
Dept: FAMILY MEDICINE | Facility: CLINIC | Age: 43
End: 2023-01-19
Payer: COMMERCIAL

## 2023-01-19 ENCOUNTER — TELEPHONE (OUTPATIENT)
Dept: FAMILY MEDICINE | Facility: CLINIC | Age: 43
End: 2023-01-19

## 2023-01-19 VITALS
DIASTOLIC BLOOD PRESSURE: 76 MMHG | OXYGEN SATURATION: 99 % | RESPIRATION RATE: 16 BRPM | HEART RATE: 90 BPM | BODY MASS INDEX: 30.53 KG/M2 | HEIGHT: 66 IN | WEIGHT: 190 LBS | TEMPERATURE: 98.8 F | SYSTOLIC BLOOD PRESSURE: 128 MMHG

## 2023-01-19 DIAGNOSIS — Z11.3 SCREEN FOR STD (SEXUALLY TRANSMITTED DISEASE): ICD-10-CM

## 2023-01-19 DIAGNOSIS — R30.0 BURNING WITH URINATION: ICD-10-CM

## 2023-01-19 DIAGNOSIS — B00.9 HSV (HERPES SIMPLEX VIRUS) INFECTION: Primary | ICD-10-CM

## 2023-01-19 DIAGNOSIS — R36.9 PENILE DISCHARGE: Primary | ICD-10-CM

## 2023-01-19 DIAGNOSIS — A54.00 ACUTE GC INFECTION OF LOWER GU TRACT: ICD-10-CM

## 2023-01-19 DIAGNOSIS — Z13.220 SCREENING FOR HYPERLIPIDEMIA: ICD-10-CM

## 2023-01-19 DIAGNOSIS — A54.9 GONORRHEA IN MALE: ICD-10-CM

## 2023-01-19 LAB
ALBUMIN UR-MCNC: 30 MG/DL
APPEARANCE UR: CLEAR
BACTERIA #/AREA URNS HPF: ABNORMAL /HPF
BASOPHILS # BLD AUTO: 0 10E3/UL (ref 0–0.2)
BASOPHILS NFR BLD AUTO: 0 %
BILIRUB UR QL STRIP: NEGATIVE
COLOR UR AUTO: YELLOW
EOSINOPHIL # BLD AUTO: 0.1 10E3/UL (ref 0–0.7)
EOSINOPHIL NFR BLD AUTO: 1 %
ERYTHROCYTE [DISTWIDTH] IN BLOOD BY AUTOMATED COUNT: 13.7 % (ref 10–15)
GLUCOSE UR STRIP-MCNC: NEGATIVE MG/DL
HCT VFR BLD AUTO: 50.7 % (ref 40–53)
HCV AB SERPL QL IA: NONREACTIVE
HGB BLD-MCNC: 16.9 G/DL (ref 13.3–17.7)
HGB UR QL STRIP: NEGATIVE
HIV 1+2 AB+HIV1 P24 AG SERPL QL IA: NONREACTIVE
KETONES UR STRIP-MCNC: NEGATIVE MG/DL
LEUKOCYTE ESTERASE UR QL STRIP: ABNORMAL
LYMPHOCYTES # BLD AUTO: 1.3 10E3/UL (ref 0.8–5.3)
LYMPHOCYTES NFR BLD AUTO: 25 %
MCH RBC QN AUTO: 28.8 PG (ref 26.5–33)
MCHC RBC AUTO-ENTMCNC: 33.3 G/DL (ref 31.5–36.5)
MCV RBC AUTO: 87 FL (ref 78–100)
MONOCYTES # BLD AUTO: 0.4 10E3/UL (ref 0–1.3)
MONOCYTES NFR BLD AUTO: 7 %
NEUTROPHILS # BLD AUTO: 3.3 10E3/UL (ref 1.6–8.3)
NEUTROPHILS NFR BLD AUTO: 66 %
NITRATE UR QL: NEGATIVE
PH UR STRIP: 6.5 [PH] (ref 5–7)
PLATELET # BLD AUTO: 204 10E3/UL (ref 150–450)
RBC # BLD AUTO: 5.86 10E6/UL (ref 4.4–5.9)
RBC #/AREA URNS AUTO: ABNORMAL /HPF
SP GR UR STRIP: 1.02 (ref 1–1.03)
SQUAMOUS #/AREA URNS AUTO: ABNORMAL /LPF
T PALLIDUM AB SER QL: NONREACTIVE
UROBILINOGEN UR STRIP-ACNC: 1 E.U./DL
WBC # BLD AUTO: 5 10E3/UL (ref 4–11)
WBC #/AREA URNS AUTO: ABNORMAL /HPF

## 2023-01-19 PROCEDURE — 87591 N.GONORRHOEAE DNA AMP PROB: CPT | Performed by: FAMILY MEDICINE

## 2023-01-19 PROCEDURE — 86696 HERPES SIMPLEX TYPE 2 TEST: CPT | Performed by: FAMILY MEDICINE

## 2023-01-19 PROCEDURE — 87086 URINE CULTURE/COLONY COUNT: CPT | Performed by: FAMILY MEDICINE

## 2023-01-19 PROCEDURE — 86780 TREPONEMA PALLIDUM: CPT | Performed by: FAMILY MEDICINE

## 2023-01-19 PROCEDURE — 81001 URINALYSIS AUTO W/SCOPE: CPT | Performed by: FAMILY MEDICINE

## 2023-01-19 PROCEDURE — 87389 HIV-1 AG W/HIV-1&-2 AB AG IA: CPT | Performed by: FAMILY MEDICINE

## 2023-01-19 PROCEDURE — 86695 HERPES SIMPLEX TYPE 1 TEST: CPT | Performed by: FAMILY MEDICINE

## 2023-01-19 PROCEDURE — 86803 HEPATITIS C AB TEST: CPT | Performed by: FAMILY MEDICINE

## 2023-01-19 PROCEDURE — 99214 OFFICE O/P EST MOD 30 MIN: CPT | Performed by: FAMILY MEDICINE

## 2023-01-19 PROCEDURE — 87491 CHLMYD TRACH DNA AMP PROBE: CPT | Performed by: FAMILY MEDICINE

## 2023-01-19 PROCEDURE — 36415 COLL VENOUS BLD VENIPUNCTURE: CPT | Performed by: FAMILY MEDICINE

## 2023-01-19 PROCEDURE — 85025 COMPLETE CBC W/AUTO DIFF WBC: CPT | Performed by: FAMILY MEDICINE

## 2023-01-19 ASSESSMENT — PAIN SCALES - GENERAL: PAINLEVEL: MILD PAIN (2)

## 2023-01-19 NOTE — PROGRESS NOTES
ICD-10-CM    1. Penile discharge  R36.9 Chlamydia trachomatis PCR     Neisseria gonorrhoeae PCR     CBC with platelets and differential     CBC with platelets and differential     Neisseria gonorrhoeae PCR     Chlamydia trachomatis PCR      2. Burning with urination  R30.0 UA Macro with Reflex to Micro and Culture - lab collect     CBC with platelets and differential     CBC with platelets and differential     UA Macro with Reflex to Micro and Culture - lab collect      3. Screen for STD (sexually transmitted disease)  Z11.3 HIV Antigen Antibody Combo     Treponema Abs w Reflex to RPR and Titer     Chlamydia trachomatis PCR     Neisseria gonorrhoeae PCR     Hepatitis C Screen Reflex to HCV RNA Quant and Genotype     Hepatitis C Screen Reflex to HCV RNA Quant and Genotype     Neisseria gonorrhoeae PCR     Chlamydia trachomatis PCR     Treponema Abs w Reflex to RPR and Titer     HIV Antigen Antibody Combo      4. Screening for hyperlipidemia  Z13.220         New to this provider with penial discharge and urethral irritation/ lesion  -patient declined swab today, discussed that it would be the best test given his symptoms  -get serum labs, and urine test  Awaiting labs, advised follow up if having new , worsening symptoms or negative test as we should do swab if still symptomatic      Subjective   Maverick is a 42 year old, presenting for the following health issues:  std testing      HPI     Concern - STD Screen Request  Onset: a couple days  Description: Patient is NOT aware of STD exposure.   He is dealing with some pain.   Intensity: mild, moderate  Progression of Symptoms:  same  Accompanying Signs & Symptoms: Pain     Some burning and pain with urination  No fever, no abdominal pain  ? Penile lesion  No sick contact      Review of Systems   Constitutional, HEENT, cardiovascular, pulmonary, GI, , musculoskeletal, neuro, skin, endocrine and psych systems are negative, except as otherwise noted.      Objective     There were no vitals taken for this visit.  There is no height or weight on file to calculate BMI.  Physical Exam   GENERAL: healthy, alert and no distress  NECK: no adenopathy, no asymmetry, masses, or scars and thyroid normal to palpation  RESP: lungs clear to auscultation - no rales, rhonchi or wheezes  CV: regular rate and rhythm, normal S1 S2, no S3 or S4, no murmur, click or rub, no peripheral edema and peripheral pulses strong  ABDOMEN: soft, nontender, no hepatosplenomegaly, no masses and bowel sounds normal  With my Medical assistant as a chaperone did an exam   (male): normal male genitalia with lesion on opening of urethra, and penial clear discharge, s or urethral discharge, no hernia  MS: no gross musculoskeletal defects noted, no edema

## 2023-01-19 NOTE — TELEPHONE ENCOUNTER
RN received call from patient.    Patient states some of his lab work came back and there may be flags.    RN advised patient that a number of labs were still in process and had not been reviewed by a provider.    RN advised once labs were in, Provider would reach out to patient.    RN advised if patient does not hear to call clinic back.    Patient verbalzied understanding.    Ede Hand, RN, BSN, PHN  [facility]

## 2023-01-20 ENCOUNTER — ALLIED HEALTH/NURSE VISIT (OUTPATIENT)
Dept: FAMILY MEDICINE | Facility: CLINIC | Age: 43
End: 2023-01-20
Payer: COMMERCIAL

## 2023-01-20 DIAGNOSIS — A54.9 GONORRHEA: Primary | ICD-10-CM

## 2023-01-20 LAB
C TRACH DNA SPEC QL NAA+PROBE: NEGATIVE
HSV1 IGG SERPL QL IA: 4.05 INDEX
HSV1 IGG SERPL QL IA: ABNORMAL
HSV2 IGG SERPL QL IA: 16.4 INDEX
HSV2 IGG SERPL QL IA: ABNORMAL
N GONORRHOEA DNA SPEC QL NAA+PROBE: POSITIVE

## 2023-01-20 PROCEDURE — 96372 THER/PROPH/DIAG INJ SC/IM: CPT | Performed by: STUDENT IN AN ORGANIZED HEALTH CARE EDUCATION/TRAINING PROGRAM

## 2023-01-20 RX ORDER — CEFTRIAXONE SODIUM 1 G
500 VIAL (EA) INJECTION ONCE
Status: COMPLETED | OUTPATIENT
Start: 2023-01-20 | End: 2023-01-20

## 2023-01-20 RX ORDER — VALACYCLOVIR HYDROCHLORIDE 1 G/1
1000 TABLET, FILM COATED ORAL 2 TIMES DAILY
Qty: 20 TABLET | Refills: 0 | Status: SHIPPED | OUTPATIENT
Start: 2023-01-20 | End: 2023-01-30

## 2023-01-20 RX ADMIN — Medication 500 MG: at 16:19

## 2023-01-20 NOTE — TELEPHONE ENCOUNTER
Please let pt know the following.     Reviewing while provider is out of office. Note isn't complete so unsure if he has an active herpes lesion or not, but his HSV lab was positive (which may be from a previous HSV infection - either genital or oral) but since it's unclear I will treat with valtrex BID x 10 days. Provider can review on return on Monday if they want it stopped. Sent to [facility]idley    gonorrheae is also positive - he needs to be treated today ideally. ordered rocephin 500 mg IM; see if he can come to clinic today for treatment.     syphilis and chlamydia negative. Hep C negative.  Urine culture pending      Ivory Madera DO

## 2023-01-20 NOTE — PROGRESS NOTES
Clinic Administered Medication Documentation    Administrations This Visit     cefTRIAXone (ROCEPHIN) in lidocaine 1% (PF) for IM administration only 500 mg     Admin Date  01/20/2023 Action  Given Dose  500 mg Route  Intramuscular Site  Left Gluteus Julian Administered By  Niko Hawthorne MA    Ordering Provider: Ivory Madera, DO    Patient Supplied?: No              Reviewed reconstitution with Desmond White RN.     Injectable Medication Documentation    Patient was given Ceftriaxone Sodium (Rocephin). Prior to medication administration, verified patients identity using patient s name and date of birth. Please see MAR and medication order for additional information. Patient instructed to remain in clinic for 15 minutes.      Was entire vial of medication used? Yes  Vial/Syringe: Single dose vial  Expiration Date:  08/2024  Was this medication supplied by the patient? No     Niko Hawthorne MA

## 2023-01-20 NOTE — TELEPHONE ENCOUNTER
Attempt #1 to call patient.     RN left voicemail and requested return call to [facility] at [phone].     Emili Wilson RN  [facility]: [place]

## 2023-01-20 NOTE — TELEPHONE ENCOUNTER
RN called patient/family and relayed provider's message. Patient/family verbalized understanding.     Pt is going to come after work around 3:45-4:00pm for the Rocephin tx.     Cindy Logan RN, BSN  [facility]: [place]

## 2023-01-20 NOTE — TELEPHONE ENCOUNTER
Routing to provider- (huddled with covering provider)    Please review labs and review/advise.    Pt calling stating he got a call from someone and he is wondering about his labs     RN unable to see calls placed to pt.    Labs are resulted- provider has not reviewed them.     Emili Wilson, RN

## 2023-01-21 LAB — BACTERIA UR CULT: NO GROWTH

## 2023-03-16 ENCOUNTER — OFFICE VISIT (OUTPATIENT)
Dept: RHEUMATOLOGY | Facility: CLINIC | Age: 43
End: 2023-03-16
Payer: COMMERCIAL

## 2023-03-16 VITALS
SYSTOLIC BLOOD PRESSURE: 136 MMHG | WEIGHT: 186.8 LBS | DIASTOLIC BLOOD PRESSURE: 86 MMHG | BODY MASS INDEX: 30.15 KG/M2 | OXYGEN SATURATION: 98 % | HEART RATE: 75 BPM

## 2023-03-16 DIAGNOSIS — G89.29 CHRONIC PAIN OF BOTH SHOULDERS: ICD-10-CM

## 2023-03-16 DIAGNOSIS — M25.511 CHRONIC PAIN OF BOTH SHOULDERS: ICD-10-CM

## 2023-03-16 DIAGNOSIS — M25.512 CHRONIC PAIN OF BOTH SHOULDERS: ICD-10-CM

## 2023-03-16 DIAGNOSIS — M25.551 BILATERAL HIP PAIN: ICD-10-CM

## 2023-03-16 DIAGNOSIS — M46.1 SACROILIITIS (H): ICD-10-CM

## 2023-03-16 DIAGNOSIS — M46.90 AXIAL SPONDYLOARTHRITIS (H): Primary | ICD-10-CM

## 2023-03-16 DIAGNOSIS — M25.552 BILATERAL HIP PAIN: ICD-10-CM

## 2023-03-16 PROCEDURE — 99214 OFFICE O/P EST MOD 30 MIN: CPT | Performed by: INTERNAL MEDICINE

## 2023-03-16 RX ORDER — NAPROXEN 500 MG/1
TABLET ORAL
Qty: 42 TABLET | Refills: 0 | Status: SHIPPED | OUTPATIENT
Start: 2023-03-16

## 2023-03-16 NOTE — PROGRESS NOTES
Rheumatology Clinic Visit      Maverick Richards MRN# [id number]   YOB: 1980 Age: 42 year old      Date of visit: 3/16/23   PCP: Balbina Alva    Chief Complaint   Patient presents with:  Axial spondyloarthritis     Assessment and Plan     1.  Spondyloarthropathy, axial and peripheral involvement: Establish care with me 2/5/2020 at which point he had R>L MCP, PIP, DIP joint involvement that is inflammatory in nature.  Ankle and knee pain is degenerative in nature.  Axial symptoms had mixed inflammatory and degenerative symptoms; right sacroiliitis suspected based on x-rays.  Additionally, he has a history of bright red blood and black stools 1-2 times per month starting in 2018 and this in the setting of inflammatory arthritis raises the concern for an inflammatory bowel disease so he has been previously referred to gastroenterology and he is planning to have a colonoscopy post-COVID19 pandemic.  At the visit on 9/25/2020 he had not yet started Humira but he continued to have inflammatory joint symptoms.  Humira was then started prior to having any GI work-up and he has felt much better, worse when he was not on Humira when he was lost to follow-up.  Inflammatory joint symptoms are well controlled at this time.  If flare of inflammatory arthritis and advised that he use naproxen twice daily for 1-3 weeks.  Continue Humira monotherapy.   Chronic illness, stable.    - Continue Humira 40mg SQ every 14 days   - As needed for flare of inflammatory arthritis: naproxen 500 mg twice daily (with meals) for 1-3 weeks.   - Advised stretching exercises daily, with programs such as yoga  - Labs in 4 months: CBC, Creatinine, Hepatic Panel, ESR, CRP, QuantiFERON-TB gold plus    # NSAIDs:  The risks and benefits of NSAIDs were discussed in detail and the patient verbalized understanding.  The risks discussed include, but are not limited to, the risk for hypersensitivity, anaphylaxis, GI upset, gastric ulcer,  nephropathy, and an increased risk for cardiovascular events.     2.  Intermittent bright red blood and black stools, reportedly 1-2 times per month for a couple years: Seen by Dr. Alban Alcantara at [facility]; planning for colonoscopy post-COVID19, and possibly sooner given the improvement but not resolution of the COVID-19 pandemic.  Advised that at this point it would be beneficial to have the colonoscopy performed.    3.  Bilateral shoulder pain: Consistent with impingement syndrome initially, but now only intermittent and question if a rotator cuff issue.  Not inflammatory in nature.  Discussed option for additional imaging versus starting physical therapy and if needed in the future then additional imaging.  He would like to start physical therapy and referral was given previously but he has not yet gone.  Advised that he call to schedule with physical therapy.  Chronic illness, progressive.    - Physical therapy referral ordered again per patient's request    4.  Lower back pain with right-sided sciatica: Encouraged physical therapy exercises more frequently at home.  Improved when he does physical therapy exercises.    5.  Bilateral hip pain: Worse with activity and improves with rest.  Never lasts for more than a couple hours at a time.    Discussed checking imaging versus getting physical therapy now and he would like to start physical therapy and referral was given previously but he has not yet gone.  Advised that he call to schedule with physical therapy.  Consider imaging in the future if no improvement with PT exercises.  May use naproxen 220 mg - 440 mg 1-2 times daily as needed for up to 1 week if hip pain is significant.  Advised that he keep a journal of flares that he says occurs approximately once every 1-2 months.  Chronic illness, progressive.   - Physical therapy referral ordered again per patient's request    6. Vitamin D deficiency: Previously used ergocalciferol 50,000 units once weekly; now on  vitamin D 2000 IU daily.   - Continue vitamin D 2000 IU daily    7.  Vaccinations: Vaccinations reviewed with Mr. Richards.  Risks and benefits of vaccinations were discussed.    - Influenza: encouraged yearly vaccination  - [id number]: Up to date  - [id number]: Up to date  - COVID-19: Advised updating    Total minutes spent in evaluation with patient, documentation, , and review of pertinent studies and chart notes: 16    Mr. Richards verbalized agreement with and understanding of the rational for the diagnosis and treatment plan.  All questions were answered to best of my ability and the patient's satisfaction. Mr. Richards was advised to contact the clinic with any questions that may arise after the clinic visit.      Thank you for involving me in the care of the patient    Return to clinic: 4 months      HPI     Maverick Richards is a 42 year old male who presents for follow-up of spondyloarthropathy    1/30/2020: Mr. Richards reported 5 years of right wrist pain, worse with certain movements that will cause pain for 1-7days that gradually improves and then another thing will trigger it again; was a period of a doing okay with a couple months then he openned the refrigerator door and it happened again; doesn't swell; maybe increased warmth; sharp shooting pain; pain will radiate to the dorsal right 2nd-4th fingers and sometimes to the volar forearm just proximal to the right wrist.  L>R shoulder pain with certain positions and he demonstrates pain with abduction. R>L MCPs>PIPs>DIPs hurt; more pain in the AM, repetitive fine motor movement at work that worsens his pain; when doing heavy lifting prior to this job he had similar symptoms and would need to adjust how he lifted things. Elbows hurt sometimes. Hips are okay.  Knees hurt at the end of the day; improve with rest; no swelling.  Ankles are worse with activity; improve with rest; long history of spraining ankles over and over again; says that he has  been eval'd several times including orthopedics and has been told to rest, splint sometimes with ACE bandages, and ice.  Toes are okay.  Morning stiffness for 1 hour, better with a hot shower; +gelling.  He then says that while he is active during the day he feels so much better, and if he sits down to rest then he feels terrible because it is so hard to get going again.  He says that when he gets home he has to get everything done right away because if he sits to relax then it is hard to get up again.     5/6/2020: no change in symptoms.  Didn't start Humira because of anticipation that it may change the colonoscopy results; wasn't concerned about COVID19 related issues.  Still with pain and stiffness and plans to start Humira for these symptoms as he doesn't know when the colonoscopy will be able to be done.     9/25/2020: Still with bloody stools intermittently but not for the last few weeks.  Pain at his MCPs more than the PIPs but also involving the DIPs that is worse in the morning and improves with time and activity.  Too much fine motor movement also bothers his hands.  Morning stiffness for at least 1 hour but is better with a hot shower.  Positive gelling phenomenon.  Lower back stiffness and pain is worse in the morning and improves with time and activity.  Had a couple times of sharp lower back pain that radiated to his right leg and he went to a chiropractor but does not want to keep going to a chiropractor he says.  Continues on high-dose vitamin D but is now out.  Left shoulder pain with abduction above 90 degrees; range of motion intact per patient.      7/9/2021: First time evaluated since he started Humira.  He says that when he was on Humira consistently he felt much better.  Now taking Humira inconsistently and feels worse, with worsening morning stiffness and pain in his hands and back.  Pain and stiffness improves with time and activity.  Not having any bloody stools.    11/5/2021: Currently  doing well with Humira he says.  Tolerating Humira well.  However, still has 1-2 hours of morning stiffness affecting his lower back, and ache in his lower back and is worse in the morning improves with time and activity.  Bowel symptoms have improved; no constipation or diarrhea no blood in his stool.  Has not had a colonoscopy.    5/5/2022: Tolerating Humira well and it is effective.  Morning stiffness for no more than 20 minutes, if present at all.  No joint swelling.  No rash.  No eye irritation.  No black or bloody stools.  No constipation or diarrhea.  Sometimes with external rotation of the hip he will have hip pain that may last for a couple hours, does not radiate, and generally resolve spontaneously.  Bilateral shoulder pain occasionally occurs with different movements; also last for no more than a couple hours at a time and does not occur every day.  Has been to a chiropractor for his hips and shoulders and this has been helpful.  Not doing stretching exercises.    9/8/2022: Does well most of the time, except for having flares approximately once every 2 months affecting his hips where he may be doing something such as getting out of the car and suddenly have sharp pain in the hip that is nonradiating and not clear if it is associated with other joint involvement.  He reports though that often the hip pain is bilateral when it occurs.  Hip symptoms typically improve quickly, but do not resolve until 1-7 days after the initial sharp pain.  Did not go to physical therapy yet but would like to do so.  No black or bloody stool.  No abdominal pain.  No eye pain or redness.    Today, 3/16/2023: Had issues with the Humira co-pay assistance program and missed a dose Humira with subsequent worsening of joint pain and stiffness, to the point where he had difficulty getting out of bed.  Morning stiffness at that time for about 1-2 hours.  After getting the co-pay assistance program squared away he was able to get  Humira and is doing well now.  No joint pain or swelling.  No morning stiffness or gelling phenomenon.    Denies fevers, chills, nausea, vomiting, constipation, diarrhea. No abdominal pain.  No black or bloody stools.  No chest pain/pressure, palpitations, or shortness of breath. No LE swelling. No neck pain. No oral or nasal sores.  No rash. No sicca symptoms.  No eye pain or redness.    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 No pertinent past medical history.  Past Surgical History     Past Surgical History:   Procedure Laterality Date     ENT SURGERY      tonsilectomy age 1     Allergy   No Known Allergies  Current Medication List     Current Outpatient Medications   Medication Sig     adalimumab (HUMIRA *CF*) 40 MG/0.4ML pen kit Inject 0.4 mLs (40 mg) Subcutaneous every 14 days . Hold for signs of infection, then seek medical attention.     ketoconazole (NIZORAL) 2 % external shampoo Use small amount as shampoo and face/body wash every other day.     medical cannabis (Patient's own supply) See Admin Instructions (The purpose of this order is to document that the patient reports taking medical cannabis.  This is not a prescription, and is not used to certify that the patient has a qualifying medical condition.)     minocycline (MINOCIN) 100 MG capsule Take 1 capsule (100 mg) by mouth 2 times daily     terbinafine (LAMISIL) 1 % external cream Apply topically 2 times daily For torso     triamcinolone (KENALOG) 0.1 % external cream Apply a thin layer twice daily to affected areas as needed.     triamcinolone (KENALOG) 0.1 % external ointment Apply topically  "2 times daily For armpits     vitamin D3 (CHOLECALCIFEROL) 50 mcg (2000 units) tablet Take 1 tablet (50 mcg) by mouth daily     valACYclovir (VALTREX) 1000 mg tablet Take 1 tablet (1,000 mg) by mouth 2 times daily for 10 days     No current facility-administered medications for this visit.         Social History   See HPI    Family History   No known family history of rheumatologic disorder    Physical Exam     Temp Readings from Last 3 Encounters:   01/19/23 98.8  F (37.1  C) (Oral)   10/26/22 98.2  F (36.8  C) (Oral)   04/21/21 98.3  F (36.8  C) (Oral)     BP Readings from Last 5 Encounters:   03/16/23 136/86   01/19/23 128/76   10/26/22 124/81   09/08/22 126/81   05/05/22 128/83     Pulse Readings from Last 1 Encounters:   03/16/23 75     Resp Readings from Last 1 Encounters:   01/19/23 16     Estimated body mass index is 30.15 kg/m  as calculated from the following:    Height as of 1/19/23: 1.676 m (5' 6\").    Weight as of this encounter: 84.7 kg (186 lb 12.8 oz).    GEN: NAD.    HEENT:  Anicteric, noninjected sclera. No obvious external lesions of the ear and nose. Hearing intact.  PULM: No increased work of breathing.   MSK: MCPs, PIPs, DIPs without swelling or tenderness to palpation.  Wrists without swelling or tenderness to palpation.  Elbows and shoulders without swelling or tenderness to palpation.  Shoulders with normal range of motion.  Hips nontender to palpation.   Spine nontender to palpation.  Nontender to palpation over the SI joints.  Knees and ankles without swelling or tenderness to palpation.  Negative MTP squeeze.  Achilles tendons nontender to palpation.  SKIN: No rash or jaundice seen  PSYCH: Alert. Appropriate.      Labs / Imaging (select studies)     RF/CCP  Recent Labs   Lab Test 01/30/20  0950   CCPIGG 1   RHF <20     CBC  Recent Labs   Lab Test 01/19/23  0826 09/06/22  0717 11/05/21  0836 02/10/21  1630 01/30/20  0950   WBC 5.0 6.3 7.4 7.7 6.5   RBC 5.86 4.78 4.70 4.95 5.00   HGB 16.9 " 14.3 13.7 14.2 14.3   HCT 50.7 43.5 42.0 44.3 43.6   MCV 87 91 89 90 87   RDW 13.7 12.8 13.6 13.6 13.1    270 249 296 265   MCH 28.8 29.9 29.1 28.7 28.6   MCHC 33.3 32.9 32.6 32.1 32.8   NEUTROPHIL 66 35 47 40.1 40.6   LYMPH 25 52 42 48.7 48.7   MONOCYTE 7 9 8 8.0 7.7   EOSINOPHIL 1 3 3 2.7 2.5   BASOPHIL 0 1 0 0.5 0.5   ANEU  --   --   --  3.1 2.6   ALYM  --   --   --  3.8 3.2   MASTER  --   --   --  0.6 0.5   AEOS  --   --   --  0.2 0.2   ABAS  --   --   --  0.0 0.0   ANEUTAUTO 3.3 2.2 3.4  --   --    ALYMPAUTO 1.3 3.4 3.1  --   --    AMONOAUTO 0.4 0.5 0.6  --   --    AEOSAUTO 0.1 0.2 0.2  --   --    ABSBASO 0.0 0.0 0.0  --   --      CMP  Recent Labs   Lab Test 09/06/22 0717 11/05/21  0836 02/10/21  1630 01/30/20  0950   CR 0.95 0.95 0.95 0.90   GFRESTIMATED >90 >90 >90 >90   GFRESTBLACK  --   --  >90 >90   BILITOTAL 0.4 0.5 0.2 0.5   ALBUMIN 3.9 3.8 4.1 3.9   PROTTOTAL 7.3 7.0 7.3 7.5   ALKPHOS 58 61 70 64   AST 19 26 26 22   ALT 25 42 38 37     Calcium/VitaminD  Recent Labs   Lab Test 09/06/22 0717 02/10/21  1630 01/30/20  0950   VITDT 26 21 10*     ESR/CRP  Recent Labs   Lab Test 09/06/22 0717 11/05/21  0836 02/10/21  1630   SED 8 7 5   CRP <2.9 <2.9 <2.9     Hepatitis B  Recent Labs   Lab Test 01/30/20  0950   HBCAB Nonreactive   HEPBANG Nonreactive     Hepatitis C  Recent Labs   Lab Test 01/19/23  0826 01/30/20  0950   HCVAB Nonreactive Nonreactive     Lyme ab screening  Recent Labs   Lab Test 01/30/20  0950   LYMEGM 0.19     Tuberculosis Screening  Recent Labs   Lab Test 11/05/21  0836 02/05/20  1545   TBRES Negative Negative     HIV Screening  Recent Labs   Lab Test 01/19/23  0826   HIAGAB Nonreactive     Recent Results (from the past 744 hour(s))   XR Sacroiliac Joint 1/2 Views    Narrative    SACROILIAC JOINT ONE TO TWO VIEWS   1/30/2020 10:36 AM     HISTORY:  Sacroiliitis question. Multiple joint pain.      Impression    IMPRESSION: I suspect mild right sacroiliitis versus  degenerative  change.    MARIAN MARSHALL MD   XR Chest 2 Views    Narrative    XR CHEST 2 VW   1/30/2020 10:36 AM    INDICATION: Multiple joint pain.    COMPARISON: No pertinent comparison study is available for review.    FINDINGS:   No focal pulmonary infiltrate, pleural effusion, or pneumothorax. The  cardiac size and mediastinal contours appear within normal limits.     No acute fracture or dislocation. No discrete osseous lesion or  radiopaque foreign body.     CONCLUSION:  No acute abnormality.    ELIZABETH KAT MD   XR Foot Bilateral G/E 3 Views    Narrative    FOOT BILATERAL THREE OR MORE VIEWS   1/30/2020 11:29 AM     HISTORY:  Polyarticular joint pain; evaluating for inflammatory  arthritis or other etiology to explain symptoms. Multiple joint pain.      Impression    IMPRESSION: Unremarkable exam.    MARIAN MARSHALL MD   XR Hand Bilateral G/E 3 Views    Narrative    HAND BILATERAL THREE OR MORE VIEWS 1/30/2020 11:29 AM     HISTORY: Polyarticular joint pain. Evaluating for inflammatory  arthritis or other etiology to explain symptoms. Multiple joint pain.      Impression    IMPRESSION:   1. Left: Positive ulnar variance. 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  2. Right: Unremarkable.    MARIAN MARSHALL MD         Immunization History     Immunization History   Administered Date(s) Administered     COVID-19 Vaccine 12+ (Pfizer) 03/17/2021, 04/07/2021     Pneumo Conj 13-V (2010&after) 05/05/2022     Pneumococcal 23 valent 09/08/2022     Td (Adult), Adsorbed 04/19/1999     Tdap (Adacel,Boostrix) 11/05/2021          Chart documentation done in part with Dragon Voice recognition Software. Although reviewed after completion, some word and grammatical error may remain.        Miguel Huerta MD

## 2023-03-16 NOTE — PATIENT INSTRUCTIONS
RHEUMATOLOGY    Dr. Miguel Huerta    [facility]  [address] [place] [id number]  Phone number: [phone]  Fax number: [phone]      Thank you for choosing [facility]!

## 2023-04-26 ENCOUNTER — THERAPY VISIT (OUTPATIENT)
Dept: PHYSICAL THERAPY | Facility: CLINIC | Age: 43
End: 2023-04-26
Attending: INTERNAL MEDICINE
Payer: COMMERCIAL

## 2023-04-26 DIAGNOSIS — M25.511 CHRONIC PAIN OF BOTH SHOULDERS: ICD-10-CM

## 2023-04-26 DIAGNOSIS — M25.552 BILATERAL HIP PAIN: ICD-10-CM

## 2023-04-26 DIAGNOSIS — M25.512 CHRONIC PAIN OF BOTH SHOULDERS: ICD-10-CM

## 2023-04-26 DIAGNOSIS — M25.551 BILATERAL HIP PAIN: ICD-10-CM

## 2023-04-26 DIAGNOSIS — G89.29 CHRONIC PAIN OF BOTH SHOULDERS: ICD-10-CM

## 2023-04-26 PROCEDURE — 97161 PT EVAL LOW COMPLEX 20 MIN: CPT | Mod: GP | Performed by: PHYSICAL THERAPIST

## 2023-04-26 PROCEDURE — 97110 THERAPEUTIC EXERCISES: CPT | Mod: GP | Performed by: PHYSICAL THERAPIST

## 2023-04-26 ASSESSMENT — ACTIVITIES OF DAILY LIVING (ADL)
HOS_ADL_COUNT: 17
WALKING_UP_STEEP_HILLS: NO DIFFICULTY AT ALL
GETTING_INTO_AND_OUT_OF_AN_AVERAGE_CAR: SLIGHT DIFFICULTY
WALKING_15_MINUTES_OR_GREATER: NO DIFFICULTY AT ALL
TWISTING/PIVOTING_ON_INVOLVED_LEG: SLIGHT DIFFICULTY
RECREATIONAL_ACTIVITIES: SLIGHT DIFFICULTY
GOING_UP_1_FLIGHT_OF_STAIRS: NO DIFFICULTY AT ALL
PUTTING_ON_SOCKS_AND_SHOES: SLIGHT DIFFICULTY
HOS_ADL_SCORE(%): 92.65
GETTING_INTO_AND_OUT_OF_A_BATHTUB: NO DIFFICULTY AT ALL
HEAVY_WORK: SLIGHT DIFFICULTY
DEEP_SQUATTING: SLIGHT DIFFICULTY
WALKING_INITIALLY: NO DIFFICULTY AT ALL
ROLLING_OVER_IN_BED: NO DIFFICULTY AT ALL
WALKING_APPROXIMATELY_10_MINUTES: NO DIFFICULTY AT ALL
LIGHT_TO_MODERATE_WORK: NO DIFFICULTY AT ALL
STANDING_FOR_15_MINUTES: NO DIFFICULTY AT ALL
GOING_DOWN_1_FLIGHT_OF_STAIRS: NO DIFFICULTY AT ALL
HOS_ADL_ITEM_SCORE_TOTAL: 63
SITTING_FOR_15_MINUTES: NO DIFFICULTY AT ALL
STEPPING_UP_AND_DOWN_CURBS: NO DIFFICULTY AT ALL
WALKING_DOWN_STEEP_HILLS: NO DIFFICULTY AT ALL
HOS_ADL_HIGHEST_POTENTIAL_SCORE: 68

## 2023-04-26 NOTE — PROGRESS NOTES
Physical Therapy Initial Evaluation  Subjective:  The history is provided by the patient.   Patient Health History  Maverick Richards being seen for Shoulders, Hips.          Pain is reported as 0/10 on pain scale.  General health as reported by patient is fair.              Current medications:  Anti-inflammatory.    Current occupation is Assembly.   Primary job tasks include:  Lifting/carrying, prolonged standing and repetitive tasks.                  Therapist Generated HPI Evaluation  Problem details: Has spondyloarthropathy. Has had shoulder pain for 10 years. Was playing  football and was hit from behind and felt like left shoulder . Very limited ROM. Now right shoulder actually feels worse. Gets pain on top of shoulders. Reaching can to be tough to do..         Type of problem:  Bilateral shoulders.    This is a chronic condition.  Condition occurred with:  Contact with object.  Where condition occurred: during recreation/sport.  Site of Pain: Superior.  and is intermittent.    Since onset symptoms are gradually worsening.  Associated symptoms:  Loss of motion/stiffness and loss of strength. Symptoms are exacerbated by certain positions, using arm at shoulder level, using arm overhead and using arm behind back  and relieved by NSAID's (Humira).      Restrictions due to condition include:  Working in normal job without restrictions.  Barriers include:  None as reported by patient.    Therapist Generated HPI Evaluation  Problem details: Pain in front of hips. Has had it for 1 year. No injury. Gets sharp pains quickly and then it slowly goes away. Going from sitting to standing, kicking out car door when getting out of car. .         Type of problem:  Bilateral hips.    This is a chronic condition.  Condition occurred with:  Insidious onset.  Where condition occurred: for unknown reasons.  Patient reports pain:  Anterior.  and is intermittent.  Pain radiates to:  No radiation.   Since onset  symptoms are gradually worsening.  Associated symptoms:  Loss of motion/stiffness.  and relieved by NSAID's (Humira).      Restrictions due to condition include:  Working in normal job without restrictions.  Barriers include:  None as reported by patient.                        Objective:    Gait:    Gait Type:  Normal                              Shoulder Evaluation:  ROM:  AROM:    Flexion:  Left:  152    Right:  169    Abduction:  Left: 140   Right:  140    Internal Rotation:  Left:  T7    Right:  T10                  PROM:              External Rotation:  Left:  90    Right:  90                    Strength:    Flexion: Left:5/5   Pain:    Right: 5/5     Pain:     Abduction:  Left: 4/5  Pain:    Right: 4/5     Pain:    Internal Rotation:  Left:5/5     Pain:    Right: 5/5     Pain:  External Rotation:   Left:5/5     Pain:   Right:5/5     Pain:              Special Tests:  Special tests assessed shoulder: Negative Spurling and Cervical spine distraction.  Left shoulder positive for the following special tests:  Impingement  Left shoulder negative for the following special tests:  Rotator cuff tear and Acromioclavicular  Right shoulder positive for the following special tests:Impingement  Right shoulder negative for the following special tests:Rotator cuff tear and Acrimioclavicular  Palpation:    Left shoulder tenderness present at:  Biceps  Left shoulder tenderness not present at: Clavicle; Acrimioclavicular; Supraspinatus; Infraspinatus; Teres Minor; Levator or Upper Trap  Right shoulder tenderness present at: Biceps  Right shoulder tenderness not present at:Clavicle; Acrimioclavicular; Supraspinatus; Infraspinatus; Teres Minor; Levator or Upper Trap                          Hip Evaluation  HIP AROM:    Flexion: Left: 110    Right:  120                    Hip Strength:    Flexion:   Left: 5/5   Pain:  Right: 5/5   Pain:                    Extension:  Left: 4/5  Pain:Right: 4/5    Pain:    Abduction:  Left: 4/5      Pain:Right: 4/5    Pain:        Knee Flexion:  Left: 5/5   Pain:Right: 5/5   Pain:  Knee Extension:  Left: 5/5   Pain:Right: 5/5    Pain:        Hip Special Testing:   Special tests hip not assessed: Positive hip scour.  Left hip positive for the following special tests:  Isabella and Fadir/Labrum  Left hip negative for the following special tests:  SLR   Right hip positive for the following special tests:  Isabella and Fadir/LabrumRight hip negative for the following special tests:  SLR    Hip Palpation:      Left hip tenderness not present at:  Greater Trachanter or Piriformis    Right hip tenderness not present at:  Greater Trachanter or Piriformis             General     ROS    Assessment/Plan:    Patient is a 42 year old male with both sides shoulder and both sides hip complaints.    Patient has the following significant findings with corresponding treatment plan.                Diagnosis 1:  Bilateral hip pain  Pain -  manual therapy, self management, education and home program  Decreased ROM/flexibility - manual therapy, therapeutic exercise, therapeutic activity and home program  Decreased joint mobility - manual therapy, therapeutic exercise, therapeutic activity and home program  Decreased strength - therapeutic exercise, therapeutic activities and home program  Decreased function - therapeutic activities and home program  Diagnosis 2:  Bilateral shoulder pain   Pain -  manual therapy, self management, education and home program  Decreased ROM/flexibility - manual therapy, therapeutic exercise, therapeutic activity and home program  Decreased joint mobility - manual therapy, therapeutic exercise, therapeutic activity and home program  Decreased strength - therapeutic exercise, therapeutic activities and home program  Decreased function - therapeutic activities and home program    Therapy Evaluation Codes:     Cumulative Therapy Evaluation is: Low complexity.    Previous and current functional limitations:  (See  Goal Flow Sheet for this information)    Short term and Long term goals: (See Goal Flow Sheet for this information)     Communication ability:  Patient appears to be able to clearly communicate and understand verbal and written communication and follow directions correctly.  Treatment Explanation - The following has been discussed with the patient:   RX ordered/plan of care  Anticipated outcomes  Possible risks and side effects  This patient would benefit from PT intervention to resume normal activities.   Rehab potential is good.    Frequency:  1 X week, once daily  Duration:  for 8 weeks  Discharge Plan:  Achieve all LTG.  Independent in home treatment program.  Reach maximal therapeutic benefit.    Please refer to the daily flowsheet for treatment today, total treatment time and time spent performing 1:1 timed codes.

## 2023-05-03 ENCOUNTER — THERAPY VISIT (OUTPATIENT)
Dept: PHYSICAL THERAPY | Facility: CLINIC | Age: 43
End: 2023-05-03
Payer: COMMERCIAL

## 2023-05-03 DIAGNOSIS — M25.552 BILATERAL HIP PAIN: Primary | ICD-10-CM

## 2023-05-03 DIAGNOSIS — M25.512 CHRONIC PAIN OF BOTH SHOULDERS: ICD-10-CM

## 2023-05-03 DIAGNOSIS — M25.551 BILATERAL HIP PAIN: Primary | ICD-10-CM

## 2023-05-03 DIAGNOSIS — M25.511 CHRONIC PAIN OF BOTH SHOULDERS: ICD-10-CM

## 2023-05-03 DIAGNOSIS — G89.29 CHRONIC PAIN OF BOTH SHOULDERS: ICD-10-CM

## 2023-05-03 PROCEDURE — 97140 MANUAL THERAPY 1/> REGIONS: CPT | Mod: GP | Performed by: PHYSICAL THERAPIST

## 2023-05-03 PROCEDURE — 97110 THERAPEUTIC EXERCISES: CPT | Mod: GP | Performed by: PHYSICAL THERAPIST

## 2023-05-10 ENCOUNTER — THERAPY VISIT (OUTPATIENT)
Dept: PHYSICAL THERAPY | Facility: CLINIC | Age: 43
End: 2023-05-10
Payer: COMMERCIAL

## 2023-05-10 DIAGNOSIS — M25.551 BILATERAL HIP PAIN: Primary | ICD-10-CM

## 2023-05-10 DIAGNOSIS — M25.511 CHRONIC PAIN OF BOTH SHOULDERS: ICD-10-CM

## 2023-05-10 DIAGNOSIS — M25.552 BILATERAL HIP PAIN: Primary | ICD-10-CM

## 2023-05-10 DIAGNOSIS — G89.29 CHRONIC PAIN OF BOTH SHOULDERS: ICD-10-CM

## 2023-05-10 DIAGNOSIS — M25.512 CHRONIC PAIN OF BOTH SHOULDERS: ICD-10-CM

## 2023-05-10 PROCEDURE — 97140 MANUAL THERAPY 1/> REGIONS: CPT | Mod: GP | Performed by: PHYSICAL THERAPIST

## 2023-05-10 PROCEDURE — 97110 THERAPEUTIC EXERCISES: CPT | Mod: GP | Performed by: PHYSICAL THERAPIST

## 2023-07-08 ENCOUNTER — HEALTH MAINTENANCE LETTER (OUTPATIENT)
Age: 43
End: 2023-07-08

## 2023-07-10 ENCOUNTER — LAB (OUTPATIENT)
Dept: LAB | Facility: CLINIC | Age: 43
End: 2023-07-10
Payer: COMMERCIAL

## 2023-07-10 DIAGNOSIS — M46.90 AXIAL SPONDYLOARTHRITIS (H): ICD-10-CM

## 2023-07-10 DIAGNOSIS — M46.1 SACROILIITIS (H): ICD-10-CM

## 2023-07-10 LAB
ALBUMIN SERPL BCG-MCNC: 4.5 G/DL (ref 3.5–5.2)
ALP SERPL-CCNC: 64 U/L (ref 40–129)
ALT SERPL W P-5'-P-CCNC: 23 U/L (ref 0–70)
AST SERPL W P-5'-P-CCNC: 35 U/L (ref 0–45)
BASOPHILS # BLD AUTO: 0.1 10E3/UL (ref 0–0.2)
BASOPHILS NFR BLD AUTO: 1 %
BILIRUB DIRECT SERPL-MCNC: <0.2 MG/DL (ref 0–0.3)
BILIRUB SERPL-MCNC: 0.4 MG/DL
CREAT SERPL-MCNC: 0.9 MG/DL (ref 0.67–1.17)
CRP SERPL-MCNC: <3 MG/L
EOSINOPHIL # BLD AUTO: 0.1 10E3/UL (ref 0–0.7)
EOSINOPHIL NFR BLD AUTO: 2 %
ERYTHROCYTE [DISTWIDTH] IN BLOOD BY AUTOMATED COUNT: 13.2 % (ref 10–15)
ERYTHROCYTE [SEDIMENTATION RATE] IN BLOOD BY WESTERGREN METHOD: 8 MM/HR (ref 0–15)
GFR SERPL CREATININE-BSD FRML MDRD: >90 ML/MIN/1.73M2
HCT VFR BLD AUTO: 41.1 % (ref 40–53)
HGB BLD-MCNC: 13.8 G/DL (ref 13.3–17.7)
IMM GRANULOCYTES # BLD: 0 10E3/UL
IMM GRANULOCYTES NFR BLD: 0 %
LYMPHOCYTES # BLD AUTO: 3 10E3/UL (ref 0.8–5.3)
LYMPHOCYTES NFR BLD AUTO: 45 %
MCH RBC QN AUTO: 28.4 PG (ref 26.5–33)
MCHC RBC AUTO-ENTMCNC: 33.6 G/DL (ref 31.5–36.5)
MCV RBC AUTO: 85 FL (ref 78–100)
MONOCYTES # BLD AUTO: 0.5 10E3/UL (ref 0–1.3)
MONOCYTES NFR BLD AUTO: 7 %
NEUTROPHILS # BLD AUTO: 3 10E3/UL (ref 1.6–8.3)
NEUTROPHILS NFR BLD AUTO: 45 %
PLATELET # BLD AUTO: 320 10E3/UL (ref 150–450)
PROT SERPL-MCNC: 7.4 G/DL (ref 6.4–8.3)
RBC # BLD AUTO: 4.86 10E6/UL (ref 4.4–5.9)
WBC # BLD AUTO: 6.7 10E3/UL (ref 4–11)

## 2023-07-10 PROCEDURE — 36415 COLL VENOUS BLD VENIPUNCTURE: CPT

## 2023-07-10 PROCEDURE — 85025 COMPLETE CBC W/AUTO DIFF WBC: CPT

## 2023-07-10 PROCEDURE — 86481 TB AG RESPONSE T-CELL SUSP: CPT

## 2023-07-10 PROCEDURE — 85652 RBC SED RATE AUTOMATED: CPT

## 2023-07-10 PROCEDURE — 80076 HEPATIC FUNCTION PANEL: CPT

## 2023-07-10 PROCEDURE — 82565 ASSAY OF CREATININE: CPT

## 2023-07-10 PROCEDURE — 86140 C-REACTIVE PROTEIN: CPT

## 2023-07-12 LAB
GAMMA INTERFERON BACKGROUND BLD IA-ACNC: 0.08 IU/ML
M TB IFN-G BLD-IMP: NEGATIVE
M TB IFN-G CD4+ BCKGRND COR BLD-ACNC: 9.92 IU/ML
MITOGEN IGNF BCKGRD COR BLD-ACNC: 0.07 IU/ML
MITOGEN IGNF BCKGRD COR BLD-ACNC: 0.12 IU/ML
QUANTIFERON MITOGEN: 10 IU/ML
QUANTIFERON NIL TUBE: 0.08 IU/ML
QUANTIFERON TB1 TUBE: 0.2 IU/ML
QUANTIFERON TB2 TUBE: 0.15

## 2023-07-13 ENCOUNTER — OFFICE VISIT (OUTPATIENT)
Dept: RHEUMATOLOGY | Facility: CLINIC | Age: 43
End: 2023-07-13
Payer: COMMERCIAL

## 2023-07-13 VITALS
BODY MASS INDEX: 29.25 KG/M2 | SYSTOLIC BLOOD PRESSURE: 124 MMHG | HEART RATE: 86 BPM | DIASTOLIC BLOOD PRESSURE: 79 MMHG | OXYGEN SATURATION: 99 % | WEIGHT: 181.2 LBS

## 2023-07-13 DIAGNOSIS — M46.90 AXIAL SPONDYLOARTHRITIS (H): Primary | ICD-10-CM

## 2023-07-13 DIAGNOSIS — M25.512 CHRONIC PAIN OF BOTH SHOULDERS: ICD-10-CM

## 2023-07-13 DIAGNOSIS — G89.29 CHRONIC PAIN OF BOTH SHOULDERS: ICD-10-CM

## 2023-07-13 DIAGNOSIS — M25.552 BILATERAL HIP PAIN: ICD-10-CM

## 2023-07-13 DIAGNOSIS — M46.1 SACROILIITIS (H): ICD-10-CM

## 2023-07-13 DIAGNOSIS — M25.551 BILATERAL HIP PAIN: ICD-10-CM

## 2023-07-13 DIAGNOSIS — M25.511 CHRONIC PAIN OF BOTH SHOULDERS: ICD-10-CM

## 2023-07-13 PROCEDURE — 99214 OFFICE O/P EST MOD 30 MIN: CPT | Performed by: INTERNAL MEDICINE

## 2023-07-13 NOTE — NURSING NOTE
RAPID3 (0-30) Cumulative Score  9.3          RAPID3 Weighted Score (divide #4 by 3 and that is the weighted score)  3.1

## 2023-07-13 NOTE — PATIENT INSTRUCTIONS
RHEUMATOLOGY    [facility]  [address] [place] [address]  [place] [address]    Phone number: [phone]  Fax number: [phone]    If you need a medication refill, please contact us as you may need lab work and/or a follow up visit prior to your refill.      Thank you for choosing [facility]!    Jade Laureano CMA Rheumatology

## 2023-07-13 NOTE — PROGRESS NOTES
Rheumatology Clinic Visit      Maverick Richards MRN# [id number]   YOB: 1980 Age: 43 year old      Date of visit: 7/13/23   PCP: Balbina Alva    Chief Complaint   Patient presents with:  Axial spondyloarthritis    Assessment and Plan     1.  Spondyloarthropathy, axial and peripheral involvement: Establish care with me 2/5/2020 at which point he had R>L MCP, PIP, DIP joint involvement that is inflammatory in nature.  Ankle and knee pain is degenerative in nature.  Axial symptoms had mixed inflammatory and degenerative symptoms; right sacroiliitis suspected based on x-rays.  Additionally, he has a history of bright red blood and black stools 1-2 times per month starting in 2018 and this in the setting of inflammatory arthritis raises the concern for an inflammatory bowel disease so he has been previously referred to gastroenterology and he is planning to have a colonoscopy post-COVID19 pandemic.  At the visit on 9/25/2020 he had not yet started Humira but he continued to have inflammatory joint symptoms.  Humira was then started prior to having any GI work-up and he has felt much better, worse when he was not on Humira when he was lost to follow-up.  Inflammatory joint symptoms are well controlled at this time.  If flare of inflammatory arthritis and advised that he use naproxen twice daily for 1-3 weeks; has not required naproxen since last seen in rheumatology clinic.  Continue Humira monotherapy.   Chronic illness, stable.    - Continue Humira 40mg SQ every 14 days   - As needed for flare of inflammatory arthritis: naproxen 500 mg twice daily (with meals) for 1-3 weeks.   - Advised stretching exercises daily, with programs such as yoga  - no rheumatology labs prior to 6 mo rheumatology follow-up     2.  Intermittent bright red blood and black stools, reportedly 1-2 times per month for a couple years: Seen by Dr. Alban Alcantara at [facility]; planning for colonoscopy post-COVID19, and possibly sooner  given the improvement but not resolution of the COVID-19 pandemic.  Advised that at this point it would be beneficial to have the colonoscopy performed.    3.  Bilateral shoulder pain: Consistent with impingement syndrome initially, but now only intermittent and question if a rotator cuff issue.  Not inflammatory in nature.  Improving with physical therapy exercises that he continues at home.    4.  Lower back pain with right-sided sciatica: Encouraged physical therapy exercises more frequently at home.  Improved when he does physical therapy exercises.    5.  Bilateral hip pain: Worse with activity and improves with rest.  Never lasts for more than a couple hours at a time.    He has started physical therapy exercises and continues them at home with significant improvement of the hip pain, now lasting for no more than a couple seconds when it occurs and the frequency of occurrence is less than once daily.  Overall improving and he will continue home physical therapy exercises.    6. Vitamin D deficiency: Previously used ergocalciferol 50,000 units once weekly; now on vitamin D 2000 IU daily.   - Continue vitamin D 2000 IU daily    7.  Vaccinations: Vaccinations reviewed with Mr. Richards.  Risks and benefits of vaccinations were discussed.    - Influenza: encouraged yearly vaccination  - [id number]: Up to date  - [id number]: Up to date  - COVID-19: Advised updating    Total minutes spent in evaluation with patient, documentation, , and review of pertinent studies and chart notes: 18    Mr. Richards verbalized agreement with and understanding of the rational for the diagnosis and treatment plan.  All questions were answered to best of my ability and the patient's satisfaction. Mr. Richards was advised to contact the clinic with any questions that may arise after the clinic visit.      Thank you for involving me in the care of the patient    Return to clinic: 6 months      HPI     Maverick Richards is a 43 year  old male who presents for follow-up of spondyloarthropathy    1/30/2020: Mr. Richards reported 5 years of right wrist pain, worse with certain movements that will cause pain for 1-7days that gradually improves and then another thing will trigger it again; was a period of a doing okay with a couple months then he openned the refrigerator door and it happened again; doesn't swell; maybe increased warmth; sharp shooting pain; pain will radiate to the dorsal right 2nd-4th fingers and sometimes to the volar forearm just proximal to the right wrist.  L>R shoulder pain with certain positions and he demonstrates pain with abduction. R>L MCPs>PIPs>DIPs hurt; more pain in the AM, repetitive fine motor movement at work that worsens his pain; when doing heavy lifting prior to this job he had similar symptoms and would need to adjust how he lifted things. Elbows hurt sometimes. Hips are okay.  Knees hurt at the end of the day; improve with rest; no swelling.  Ankles are worse with activity; improve with rest; long history of spraining ankles over and over again; says that he has been eval'd several times including orthopedics and has been told to rest, splint sometimes with ACE bandages, and ice.  Toes are okay.  Morning stiffness for 1 hour, better with a hot shower; +gelling.  He then says that while he is active during the day he feels so much better, and if he sits down to rest then he feels terrible because it is so hard to get going again.  He says that when he gets home he has to get everything done right away because if he sits to relax then it is hard to get up again.     5/6/2020: no change in symptoms.  Didn't start Humira because of anticipation that it may change the colonoscopy results; wasn't concerned about COVID19 related issues.  Still with pain and stiffness and plans to start Humira for these symptoms as he doesn't know when the colonoscopy will be able to be done.     9/25/2020: Still with bloody stools  intermittently but not for the last few weeks.  Pain at his MCPs more than the PIPs but also involving the DIPs that is worse in the morning and improves with time and activity.  Too much fine motor movement also bothers his hands.  Morning stiffness for at least 1 hour but is better with a hot shower.  Positive gelling phenomenon.  Lower back stiffness and pain is worse in the morning and improves with time and activity.  Had a couple times of sharp lower back pain that radiated to his right leg and he went to a chiropractor but does not want to keep going to a chiropractor he says.  Continues on high-dose vitamin D but is now out.  Left shoulder pain with abduction above 90 degrees; range of motion intact per patient.      7/9/2021: First time evaluated since he started Humira.  He says that when he was on Humira consistently he felt much better.  Now taking Humira inconsistently and feels worse, with worsening morning stiffness and pain in his hands and back.  Pain and stiffness improves with time and activity.  Not having any bloody stools.    11/5/2021: Currently doing well with Humira he says.  Tolerating Humira well.  However, still has 1-2 hours of morning stiffness affecting his lower back, and ache in his lower back and is worse in the morning improves with time and activity.  Bowel symptoms have improved; no constipation or diarrhea no blood in his stool.  Has not had a colonoscopy.    5/5/2022: Tolerating Humira well and it is effective.  Morning stiffness for no more than 20 minutes, if present at all.  No joint swelling.  No rash.  No eye irritation.  No black or bloody stools.  No constipation or diarrhea.  Sometimes with external rotation of the hip he will have hip pain that may last for a couple hours, does not radiate, and generally resolve spontaneously.  Bilateral shoulder pain occasionally occurs with different movements; also last for no more than a couple hours at a time and does not occur  every day.  Has been to a chiropractor for his hips and shoulders and this has been helpful.  Not doing stretching exercises.    9/8/2022: Does well most of the time, except for having flares approximately once every 2 months affecting his hips where he may be doing something such as getting out of the car and suddenly have sharp pain in the hip that is nonradiating and not clear if it is associated with other joint involvement.  He reports though that often the hip pain is bilateral when it occurs.  Hip symptoms typically improve quickly, but do not resolve until 1-7 days after the initial sharp pain.  Did not go to physical therapy yet but would like to do so.  No black or bloody stool.  No abdominal pain.  No eye pain or redness.    3/16/2023: Had issues with the Humira co-pay assistance program and missed a dose Humira with subsequent worsening of joint pain and stiffness, to the point where he had difficulty getting out of bed.  Morning stiffness at that time for about 1-2 hours.  After getting the co-pay assistance program squared away he was able to get Humira and is doing well now.  No joint pain or swelling.  No morning stiffness or gelling phenomenon.    Today, 7/13/2023: Physical therapy for his shoulders and hips have been very effective.  Now with only occasional sharp pain for a couple seconds that occurs no more than once daily.  No joint swelling.  Humira is effective.  No missed doses of Humira.  Overall very happy with how well he is doing.  He is going to continue home physical therapy exercises.    Denies fevers, chills, nausea, vomiting, constipation, diarrhea. No abdominal pain.  No black or bloody stools.  No chest pain/pressure, palpitations, or shortness of breath. No LE swelling. No neck pain. No oral or nasal sores.  No rash. No sicca symptoms.  No eye pain or redness.    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 No pertinent past medical history.  Past Surgical History     Past Surgical History:   Procedure Laterality Date     ENT SURGERY      tonsilectomy age 1     Allergy   No Known Allergies  Current Medication List     Current Outpatient Medications   Medication Sig     adalimumab (HUMIRA *CF*) 40 MG/0.4ML pen kit Inject 0.4 mLs (40 mg) Subcutaneous every 14 days . Hold for signs of infection, then seek medical attention.     ketoconazole (NIZORAL) 2 % external shampoo Use small amount as shampoo and face/body wash every other day.     medical cannabis (Patient's own supply) See Admin Instructions (The purpose of this order is to document that the patient reports taking medical cannabis.  This is not a prescription, and is not used to certify that the patient has a qualifying medical condition.)     minocycline (MINOCIN) 100 MG capsule Take 1 capsule (100 mg) by mouth 2 times daily     naproxen (NAPROSYN) 500 MG tablet For flare of inflammatory arthritis: naproxen 500 mg twice daily (with meals) for 1-3 weeks.  Take as directed.     terbinafine (LAMISIL) 1 % external cream Apply topically 2 times daily For torso     triamcinolone (KENALOG) 0.1 % external cream Apply a thin layer twice daily to affected areas as needed.     triamcinolone (KENALOG) 0.1 % external ointment Apply topically 2 times daily For armpits     vitamin D3 (CHOLECALCIFEROL) 50 mcg (2000 units) tablet Take 1 tablet (50 mcg) by mouth daily     valACYclovir (VALTREX) 1000 mg tablet Take 1 tablet (1,000 mg) by mouth 2 times daily for 10 days     No current facility-administered medications for this visit.         Social  "History   See HPI    Family History   No known family history of rheumatologic disorder    Physical Exam     Temp Readings from Last 3 Encounters:   01/19/23 98.8  F (37.1  C) (Oral)   10/26/22 98.2  F (36.8  C) (Oral)   04/21/21 98.3  F (36.8  C) (Oral)     BP Readings from Last 5 Encounters:   07/13/23 124/79   03/16/23 136/86   01/19/23 128/76   10/26/22 124/81   09/08/22 126/81     Pulse Readings from Last 1 Encounters:   07/13/23 86     Resp Readings from Last 1 Encounters:   01/19/23 16     Estimated body mass index is 29.25 kg/m  as calculated from the following:    Height as of 1/19/23: 1.676 m (5' 6\").    Weight as of this encounter: 82.2 kg (181 lb 3.2 oz).    GEN: NAD.    HEENT:  Anicteric, noninjected sclera. No obvious external lesions of the ear and nose. Hearing intact.  PULM: No increased work of breathing.   MSK: MCPs, PIPs, DIPs without swelling or tenderness to palpation.  Wrists without swelling or tenderness to palpation.  Elbows and shoulders without swelling or tenderness to palpation.  Shoulders with normal range of motion.  Hips nontender to palpation.   Spine nontender to palpation.  Nontender to palpation over the SI joints.  Knees and ankles without swelling or tenderness to palpation.  Negative MTP squeeze.  Achilles tendons nontender to palpation.  SKIN: No rash or jaundice seen  PSYCH: Alert. Appropriate.      Labs / Imaging (select studies)     RF/CCP  Recent Labs   Lab Test 01/30/20  0950   CCPIGG 1   RHF <20     CBC  Recent Labs   Lab Test 07/10/23  1536 01/19/23  0826 09/06/22  0717 11/05/21  0836 02/10/21  1630 01/30/20  0950   WBC 6.7 5.0 6.3   < > 7.7 6.5   RBC 4.86 5.86 4.78   < > 4.95 5.00   HGB 13.8 16.9 14.3   < > 14.2 14.3   HCT 41.1 50.7 43.5   < > 44.3 43.6   MCV 85 87 91   < > 90 87   RDW 13.2 13.7 12.8   < > 13.6 13.1    204 270   < > 296 265   MCH 28.4 28.8 29.9   < > 28.7 28.6   MCHC 33.6 33.3 32.9   < > 32.1 32.8   NEUTROPHIL 45 66 35   < > 40.1 40.6   LYMPH " 45 25 52   < > 48.7 48.7   MONOCYTE 7 7 9   < > 8.0 7.7   EOSINOPHIL 2 1 3   < > 2.7 2.5   BASOPHIL 1 0 1   < > 0.5 0.5   ANEU  --   --   --   --  3.1 2.6   ALYM  --   --   --   --  3.8 3.2   MASTER  --   --   --   --  0.6 0.5   AEOS  --   --   --   --  0.2 0.2   ABAS  --   --   --   --  0.0 0.0   ANEUTAUTO 3.0 3.3 2.2   < >  --   --    ALYMPAUTO 3.0 1.3 3.4   < >  --   --    AMONOAUTO 0.5 0.4 0.5   < >  --   --    AEOSAUTO 0.1 0.1 0.2   < >  --   --    ABSBASO 0.1 0.0 0.0   < >  --   --     < > = values in this interval not displayed.     CMP  Recent Labs   Lab Test 07/10/23  1536 09/06/22  0717 11/05/21  0836 02/10/21  1630 01/30/20  0950   CR 0.90 0.95 0.95 0.95 0.90   GFRESTIMATED >90 >90 >90 >90 >90   GFRESTBLACK  --   --   --  >90 >90   BILITOTAL 0.4 0.4 0.5 0.2 0.5   ALBUMIN 4.5 3.9 3.8 4.1 3.9   PROTTOTAL 7.4 7.3 7.0 7.3 7.5   ALKPHOS 64 58 61 70 64   AST 35 19 26 26 22   ALT 23 25 42 38 37     Calcium/VitaminD  Recent Labs   Lab Test 09/06/22  0717 02/10/21  1630 01/30/20  0950   VITDT 26 21 10*     ESR/CRP  Recent Labs   Lab Test 07/10/23  1536 09/06/22  0717 11/05/21  0836 02/10/21  1630   SED 8 8 7 5   CRP  --  <2.9 <2.9 <2.9   CRPI <3.00  --   --   --      Hepatitis B  Recent Labs   Lab Test 01/30/20  0950   HBCAB Nonreactive   HEPBANG Nonreactive     Hepatitis C  Recent Labs   Lab Test 01/19/23  0826 01/30/20  0950   HCVAB Nonreactive Nonreactive     Lyme ab screening  Recent Labs   Lab Test 01/30/20  0950   LYMEGM 0.19     Tuberculosis Screening  Recent Labs   Lab Test 07/10/23  1536 11/05/21  0836 02/05/20  1545   TBRES Negative Negative Negative     HIV Screening  Recent Labs   Lab Test 01/19/23  0826   HIAGAB Nonreactive     Recent Results (from the past 744 hour(s))   XR Sacroiliac Joint 1/2 Views    Narrative    SACROILIAC JOINT ONE TO TWO VIEWS   1/30/2020 10:36 AM     HISTORY:  Sacroiliitis question. Multiple joint pain.      Impression    IMPRESSION: I suspect mild right sacroiliitis versus  degenerative  change.    MARIAN MARSHALL MD   XR Chest 2 Views    Narrative    XR CHEST 2 VW   1/30/2020 10:36 AM    INDICATION: Multiple joint pain.    COMPARISON: No pertinent comparison study is available for review.    FINDINGS:   No focal pulmonary infiltrate, pleural effusion, or pneumothorax. The  cardiac size and mediastinal contours appear within normal limits.     No acute fracture or dislocation. No discrete osseous lesion or  radiopaque foreign body.     CONCLUSION:  No acute abnormality.    ELIZABETH KAT MD   XR Foot Bilateral G/E 3 Views    Narrative    FOOT BILATERAL THREE OR MORE VIEWS   1/30/2020 11:29 AM     HISTORY:  Polyarticular joint pain; evaluating for inflammatory  arthritis or other etiology to explain symptoms. Multiple joint pain.      Impression    IMPRESSION: Unremarkable exam.    MARIAN MARSHALL MD   XR Hand Bilateral G/E 3 Views    Narrative    HAND BILATERAL THREE OR MORE VIEWS 1/30/2020 11:29 AM     HISTORY: Polyarticular joint pain. Evaluating for inflammatory  arthritis or other etiology to explain symptoms. Multiple joint pain.      Impression    IMPRESSION:   1. Left: Positive ulnar variance. 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  2. Right: Unremarkable.    MARIAN MARSHALL MD         Immunization History     Immunization History   Administered Date(s) Administered     COVID-19 MONOVALENT 12+ (Pfizer) 03/17/2021, 04/07/2021     Pneumo Conj 13-V (2010&after) 05/05/2022     Pneumococcal 23 valent 09/08/2022     TDAP (Adacel,Boostrix) 11/05/2021     Td (Adult), Adsorbed 04/19/1999          Chart documentation done in part with Dragon Voice recognition Software. Although reviewed after completion, some word and grammatical error may remain.        Miguel Huerta MD

## 2023-11-28 PROBLEM — M25.552 BILATERAL HIP PAIN: Status: RESOLVED | Noted: 2023-04-26 | Resolved: 2023-11-28

## 2023-11-28 PROBLEM — M25.551 BILATERAL HIP PAIN: Status: RESOLVED | Noted: 2023-04-26 | Resolved: 2023-11-28

## 2023-11-28 PROBLEM — M25.511 BILATERAL SHOULDER PAIN: Status: RESOLVED | Noted: 2023-04-26 | Resolved: 2023-11-28

## 2023-11-28 PROBLEM — M25.512 BILATERAL SHOULDER PAIN: Status: RESOLVED | Noted: 2023-04-26 | Resolved: 2023-11-28

## 2023-11-28 NOTE — PROGRESS NOTES
Discharge Note    Progress reporting period is from initial evaluation date (please see noted date below) to May 10, 2023.  Maverick failed to follow up and current status is unknown.  Please see information below for last relevant information on current status.  Patient seen for 3 visits.    SUBJECTIVE  Subjective changes noted by patient:  Hip is feeling better. Feeling pretty good today.  .  Current pain level is 0/10.     Previous pain level was  0/10.   Changes in function:  Yes (See Goal flowsheet attached for changes in current functional level)  Adverse reaction to treatment or activity: None    OBJECTIVE  Changes noted in objective findings: AROM - Shoulder abduction - L - 162 and R - 168     ASSESSMENT/PLAN  Diagnosis: B Hip pain / B shoulder pain   Updated problem list and treatment plan:   Pain - HEP  Decreased ROM/flexibility - HEP  Decreased function - HEP  Decreased strength - HEP  STG/LTGs have been met or progress has been made towards goals:  Yes, please see goal flowsheet for most current information  Assessment of Progress: current status is unknown.    Last current status: Pt is progressing as expected   Self Management Plans:  HEP  I have re-evaluated this patient and find that the nature, scope, duration and intensity of the therapy is appropriate for the medical condition of the patient.  Maverick continues to require the following intervention to meet STG and LTG's:  HEP.    Recommendations:  Discharge with current home program.  Patient to follow up with MD as needed.    Please refer to the daily flowsheet for treatment today, total treatment time and time spent performing 1:1 timed codes.

## 2024-05-02 ENCOUNTER — TELEPHONE (OUTPATIENT)
Dept: RHEUMATOLOGY | Facility: CLINIC | Age: 44
End: 2024-05-02
Payer: COMMERCIAL

## 2024-05-02 NOTE — TELEPHONE ENCOUNTER
PA Initiation    Medication: HUMIRA *CF* PEN 40 MG/0.4ML SC PNKT  Insurance Company: [employer] [place] - Phone [phone] Fax [phone]  Pharmacy Filling the Rx: [facility] - [place], [place] - [address]   Filling Pharmacy Phone: [phone]  Filling Pharmacy Fax: [phone]  Start Date: 5/2/2024  NORMAN (Key: [id number])    [email or web address]        Thank You,     Zander Michelle Danisha  Specialty Pharmacy Clinic [facility]  [email or web address]  [email or web address]  Phone: [phone]  Fax: [phone]

## 2024-05-03 NOTE — TELEPHONE ENCOUNTER
Prior Authorization Approval    Medication: HUMIRA *CF* PEN 40 MG/0.4ML SC PNKT  Authorization Effective Date: 4/2/2024  Authorization Expiration Date: 5/2/2025  Approved Dose/Quantity: 2 / 28  Reference #: NORMAN (Key: [id number])   Insurance Company: [employer] [place] - Phone [phone] Fax [phone]  Expected CoPay: $ 5  CoPay Card Available: Other (see comments)    Financial Assistance Needed: [email or web address]   Which Pharmacy is filling the prescription: [facility] - [place], [place] - [address]   Pharmacy Notified: renewal  Patient Notified: renewal          Thank You,     Snehal Michelle [facility]  Specialty Pharmacy Clinic [facility]  [email or web address]  [email or web address]  Phone: [phone]  Fax: [phone]

## 2024-05-29 ENCOUNTER — PHARMACY VISIT (OUTPATIENT)
Dept: PHARMACY | Facility: CLINIC | Age: 44
End: 2024-05-29
Payer: COMMERCIAL

## 2024-05-29 NOTE — PROGRESS NOTES
"Ankylosing Spondylitis Clinical Follow Up Assessment    Activity Medications    HUMIRA        Summary Notes  Spoke with Maverick for assessment. Current Regimen: Humira 40mg every other week     Med/Allergy Changes: None     Adherence: Pt is restarting Humira after being off for several months. There was a combination of barriers that caused him to not take his doses. HSA card was not active, had to contact mfr to renew copay assistance, and also has a  baby at home. Was just too many hoops to jump through but now is getting back on track. Took dose on . Has his kids every other week so will use that to remember. Also suggested writing it on his calendar.     Tolerability: No issues noted today.     AS: \"Seems to be doing ok.\" Part of the reason he wasnt motivated to continue dealing with HSA and copay assistance issues is because he isnt feeling too bad. Hasnt had any major flares. Sometimes kneeling down can cause pain in his knees. He isnt sure if that is from his arthritis or if there is some type of injury. He is going to have MD look at it. Usually when he has a flare it is in his hips and shoulders but those are doing ok. He hasnt seen Dr. Huerta since 2023 so recommended scheduling something ASAP.     Follow up: 1 month    Nelly Rushing PharmD  Therapy Management Pharmacist  [facility]          "

## 2024-07-12 ENCOUNTER — TELEPHONE (OUTPATIENT)
Dept: RHEUMATOLOGY | Facility: CLINIC | Age: 44
End: 2024-07-12
Payer: COMMERCIAL

## 2024-07-12 DIAGNOSIS — M46.90 AXIAL SPONDYLOARTHRITIS (H): ICD-10-CM

## 2024-07-12 DIAGNOSIS — M46.1 SACROILIITIS (H): ICD-10-CM

## 2024-07-12 NOTE — TELEPHONE ENCOUNTER
PA Initiation    Medication: HUMIRA *CF* PEN 40 MG/0.4ML SC PNKT  Insurance Company: [employer] ([facility]) - Phone [phone] Fax [phone]:  [id number]  Pharmacy Filling the Rx: [facility] - [place], [place] - [address]  Filling Pharmacy Phone: [phone]  Filling Pharmacy Fax: [phone]  Start Date: 7/12/2024  NORMAN (Key: [id number])    [email or web address]        Thank You,     Snehal Michelle [facility]  Specialty Pharmacy Clinic [facility] Special[facility]  [email or web address]  [email or web address]  Phone: [phone]  Fax: [phone]

## 2024-07-17 NOTE — TELEPHONE ENCOUNTER
Additional information:        Called Rx Benefits Plan phone# [phone]    Per Rx benefits patient is an [employer] member, unable to submit PA via [email or web address]    Thank You,     Snehal Michelle CPhT  Specialty Pharmacy Clinic [facility] Specialty  [email or web address]  [email or web address]  Phone: [phone]  Fax: [phone]     Immediate Brief Procedure Note    Patient: Wei Penn. Pre-op Dx: Inferior STEMI    Post-op Dx: Same    Procedure:   Coronary angiogram  Left heart catheterization with LVEDP measurement and left ventriculogram  Percutaneous coronary intervention to left circumflex coronary artery with 3.5 x 15 mm drug-eluting stent. Surgeon: Nona Shankar MD  Assistants:  Mc Lopez MD    Findings:   LMCA: no disease  LAD: minimal intraluminal irregularities  LCX: mid vessel total occlusion distal to takeoff of a moderate size Om, PCI as above.   RCA: normal, dominant  Lv: EF 55%, no signif WMA on LV-gram, LVEDP 29 mmHg    ASSESSMENT:   26-year-old male presenting with inferior STEMI as first presentation of coronary artery disease, found to have a total occlusion of left circumflex coronary artery    PLAN:   Dual antiplatelet therapy with aspirin and Plavix  Continue Integrilin drip for 6 hours then stop  Heparin sheath pull protocol  Echo tomorrow  Discussed above with

## 2024-07-18 NOTE — TELEPHONE ENCOUNTER
PA Initiation - KEY generated by [employer] [facility]    Medication: HUMIRA *CF* PEN 40 MG/0.4ML SC PNKT  Insurance Company: [employer] ([facility]) - Phone [phone] Fax [phone]:  RXBENEFIT  Pharmacy Filling the Rx: [facility] - [place], [place] - [address] [facility]  Filling Pharmacy Phone: [phone]  Filling Pharmacy Fax: [phone]  Start Date: 7/12/2024  NORMAN (Key: [id number])  PA Case ID #: [id number]        Thank You,     Snehal Michelle [facility]  Specialty Pharmacy Clinic [facility] Specialty  [email or web address]  [email or web address]  Phone: [phone]  Fax: [phone]

## 2024-07-18 NOTE — TELEPHONE ENCOUNTER
Prior Authorization Approval    Medication: HUMIRA *CF* PEN 40 MG/0.4ML SC PNKT  Authorization Effective Date: 7/18/2024  Authorization Expiration Date: 1/18/2025  Approved Dose/Quantity: 2 pen / 28 day  Reference #: NORMAN (Key: [id number])   Insurance Company: [employer] ([facility]) - Phone [phone] Fax [phone]:  RXBENEFIT  Expected CoPay: $    CoPay Card Available: Other (see comments)    Financial Assistance Needed: [email or web address]  Which Pharmacy is filling the prescription: [facility] - [address]  Pharmacy Notified: yes  Patient Notified:          Thank You,     Snehal Michelle [facility]  Specialty Pharmacy Clinic [facility] Specialty  [email or web address]  [email or web address]  Phone: [phone]  Fax: [phone]

## 2024-07-30 ENCOUNTER — TELEPHONE (OUTPATIENT)
Dept: RHEUMATOLOGY | Facility: CLINIC | Age: 44
End: 2024-07-30
Payer: COMMERCIAL

## 2024-07-30 NOTE — TELEPHONE ENCOUNTER
Patient is overdue for appointment. Tried calling patient and there was no answer and the mailbox was full. Will try again.     Chana JORDAN RN, Specialty Clinic 07/30/24 3:44 PM

## 2024-07-30 NOTE — TELEPHONE ENCOUNTER
Agree that patient needs follow-up before prescription modification or renewal.    If unable to get a hold of patient via telephone then please send the letter.    Miguel Huerta MD  7/30/2024

## 2024-07-30 NOTE — TELEPHONE ENCOUNTER
[facility] Call Center    Phone Message    May a detailed message be left on voicemail: yes     Reason for Call: Medication Question or concern regarding medication   Prescription Clarification  Name of Medication: adalimumab (HUMIRA *CF*) 40 MG/0.4ML pen kit  Prescribing Provider: Miguel Huerta    Pharmacy:   [facility] - [place], [place] - [address] [facility]      What on the order needs clarification? Pharmacy call and stated that the pt is requesting for amjevita and not Humira. Per pharmacy please follow-up regarding this concern. Thank you      Action Taken: Other: RHEUM    Travel Screening: Not Applicable     Date of Service:

## 2024-07-31 NOTE — TELEPHONE ENCOUNTER
Called patient and scheduled him for next week with Dr. Huerta.     Chana JORDAN RN, Specialty Clinic 07/31/24 11:10 AM

## 2024-08-08 ENCOUNTER — OFFICE VISIT (OUTPATIENT)
Dept: RHEUMATOLOGY | Facility: CLINIC | Age: 44
End: 2024-08-08

## 2024-08-08 VITALS
BODY MASS INDEX: 27.76 KG/M2 | RESPIRATION RATE: 16 BRPM | DIASTOLIC BLOOD PRESSURE: 73 MMHG | HEART RATE: 89 BPM | WEIGHT: 172 LBS | OXYGEN SATURATION: 98 % | SYSTOLIC BLOOD PRESSURE: 153 MMHG

## 2024-08-08 DIAGNOSIS — M46.1 SACROILIITIS (H): ICD-10-CM

## 2024-08-08 DIAGNOSIS — M46.90 AXIAL SPONDYLOARTHRITIS (H): ICD-10-CM

## 2024-08-08 LAB
ALBUMIN SERPL BCG-MCNC: 4.6 G/DL (ref 3.5–5.2)
ALP SERPL-CCNC: 67 U/L (ref 40–150)
ALT SERPL W P-5'-P-CCNC: 14 U/L (ref 0–70)
AST SERPL W P-5'-P-CCNC: 27 U/L (ref 0–45)
BASOPHILS # BLD AUTO: 0 10E3/UL (ref 0–0.2)
BASOPHILS NFR BLD AUTO: 1 %
BILIRUB DIRECT SERPL-MCNC: <0.2 MG/DL (ref 0–0.3)
BILIRUB SERPL-MCNC: 0.3 MG/DL
CREAT SERPL-MCNC: 1.06 MG/DL (ref 0.67–1.17)
CRP SERPL-MCNC: <3 MG/L
EGFRCR SERPLBLD CKD-EPI 2021: 88 ML/MIN/1.73M2
EOSINOPHIL # BLD AUTO: 0.1 10E3/UL (ref 0–0.7)
EOSINOPHIL NFR BLD AUTO: 1 %
ERYTHROCYTE [DISTWIDTH] IN BLOOD BY AUTOMATED COUNT: 13.5 % (ref 10–15)
ERYTHROCYTE [SEDIMENTATION RATE] IN BLOOD BY WESTERGREN METHOD: 8 MM/HR (ref 0–15)
HCT VFR BLD AUTO: 39.7 % (ref 40–53)
HGB BLD-MCNC: 12.9 G/DL (ref 13.3–17.7)
IMM GRANULOCYTES # BLD: 0 10E3/UL
IMM GRANULOCYTES NFR BLD: 0 %
LYMPHOCYTES # BLD AUTO: 2.5 10E3/UL (ref 0.8–5.3)
LYMPHOCYTES NFR BLD AUTO: 38 %
MCH RBC QN AUTO: 28.8 PG (ref 26.5–33)
MCHC RBC AUTO-ENTMCNC: 32.5 G/DL (ref 31.5–36.5)
MCV RBC AUTO: 89 FL (ref 78–100)
MONOCYTES # BLD AUTO: 0.5 10E3/UL (ref 0–1.3)
MONOCYTES NFR BLD AUTO: 7 %
NEUTROPHILS # BLD AUTO: 3.5 10E3/UL (ref 1.6–8.3)
NEUTROPHILS NFR BLD AUTO: 53 %
PLATELET # BLD AUTO: 344 10E3/UL (ref 150–450)
PROT SERPL-MCNC: 7.6 G/DL (ref 6.4–8.3)
RBC # BLD AUTO: 4.48 10E6/UL (ref 4.4–5.9)
WBC # BLD AUTO: 6.6 10E3/UL (ref 4–11)

## 2024-08-08 PROCEDURE — 86140 C-REACTIVE PROTEIN: CPT | Performed by: INTERNAL MEDICINE

## 2024-08-08 PROCEDURE — 36415 COLL VENOUS BLD VENIPUNCTURE: CPT | Performed by: INTERNAL MEDICINE

## 2024-08-08 PROCEDURE — 80076 HEPATIC FUNCTION PANEL: CPT | Performed by: INTERNAL MEDICINE

## 2024-08-08 PROCEDURE — 99214 OFFICE O/P EST MOD 30 MIN: CPT | Performed by: INTERNAL MEDICINE

## 2024-08-08 PROCEDURE — G2211 COMPLEX E/M VISIT ADD ON: HCPCS | Performed by: INTERNAL MEDICINE

## 2024-08-08 PROCEDURE — 85025 COMPLETE CBC W/AUTO DIFF WBC: CPT | Performed by: INTERNAL MEDICINE

## 2024-08-08 PROCEDURE — 85652 RBC SED RATE AUTOMATED: CPT | Performed by: INTERNAL MEDICINE

## 2024-08-08 PROCEDURE — 86481 TB AG RESPONSE T-CELL SUSP: CPT | Performed by: INTERNAL MEDICINE

## 2024-08-08 PROCEDURE — 82565 ASSAY OF CREATININE: CPT | Performed by: INTERNAL MEDICINE

## 2024-08-08 RX ORDER — ADALIMUMAB-ATTO 40 MG/.4ML
40 INJECTION SUBCUTANEOUS
Qty: 0.8 ML | Refills: 5 | Status: CANCELLED | OUTPATIENT
Start: 2024-08-08

## 2024-08-08 NOTE — PATIENT INSTRUCTIONS
RHEUMATOLOGY    [facility]  [address] [place] [address]  [place] [address]    Phone number: [phone]  Fax number: [phone]    If you need a medication refill, please contact us as you may need lab work and/or a follow up visit prior to your refill.      Thank you for choosing [facility]!    Jade Laureano CMA Rheumatology

## 2024-08-08 NOTE — NURSING NOTE
RAPID3 (0-30) Cumulative Score  3.7          RAPID3 Weighted Score (divide #4 by 3 and that is the weighted score)  1.2

## 2024-08-08 NOTE — PROGRESS NOTES
Rheumatology Clinic Visit      Maverick Richards MRN# [id number]   YOB: 1980 Age: 44 year old      Date of visit: 8/08/24   PCP: Balbina Alva    Chief Complaint   Patient presents with:  Axial spondyloarthritis     Assessment and Plan     1.  Spondyloarthropathy, axial and peripheral involvement: Establish care with me 2/5/2020 at which point he had R>L MCP, PIP, DIP joint involvement that is inflammatory in nature.  Ankle and knee pain is degenerative in nature.  Axial symptoms had mixed inflammatory and degenerative symptoms; right sacroiliitis suspected based on x-rays.  Additionally, he has a history of bright red blood and black stools 1-2 times per month starting in 2018 and this in the setting of inflammatory arthritis raises the concern for an inflammatory bowel disease so he has been previously referred to gastroenterology and he is planning to have a colonoscopy post-COVID19 pandemic.  At the visit on 9/25/2020 he had not yet started Humira but he continued to have inflammatory joint symptoms.  Humira was then started prior to having any GI work-up and he has felt much better, worse when he was not on Humira when he was lost to follow-up.  Inflammatory joint symptoms are well controlled at this time.  If flare of inflammatory arthritis and advised that he use naproxen twice daily for 1-3 weeks; has not required naproxen since last seen in rheumatology clinic.  Unable to tell if insurance is requiring Amjevita or if they are okay with continuing Humira; he would like to continue Humira so I have put in the order for this but if required by insurance then we will change to Amjevita or another adalimumab biosimilar if required.  Chronic illness, stable.    - Continue Humira 40mg SQ every 14 days   - As needed for flare of inflammatory arthritis: naproxen 500 mg twice daily (with meals) for 1-3 weeks.   - Advised stretching exercises daily, with programs such as yoga  - Labs today: CBC,  Creatinine, Hepatic Panel, ESR, CRP, QuantiFERON-TB gold plus    2.  Intermittent bright red blood and black stools, reportedly 1-2 times per month for a couple years that resolved after starting Humira: Seen by Dr. Alban Alcantara at [facility] and reportedly planning to have a colonoscopy but this was never performed.  Stressed the importance of having a colonoscopy to determine if there is evidence of inflammatory bowel disease associated with the arthritis as this would affect cancer screening frequency.    3.  History of bilateral shoulder pain: Previously was consistent with impingement syndrome; resolved with physical therapy exercises    4.  History of lower back pain with right-sided sciatica: Resolved with physical therapy exercises.  Not an issue today.    5.  Injected sclera of the left eye on the nasal aspect only: Reportedly for 2-week duration and had ear infection 2 weeks ago.  Suspect viral.  He is going to follow-up with his ophthalmologist.  No pain or blurry vision.    6. Vitamin D deficiency: Previously used ergocalciferol 50,000 units once weekly; now on vitamin D 2000 IU daily.   - Continue vitamin D 2000 IU daily    7.  Vaccinations: Vaccinations reviewed with Mr. Richards.  Risks and benefits of vaccinations were discussed.    - Influenza: encouraged yearly vaccination  - [id number]: Up to date  - [id number]: Up to date  - COVID-19: Advised updating    Total minutes spent in evaluation with patient, documentation, , and review of pertinent studies and chart notes: 22    Mr. Richards verbalized agreement with and understanding of the rational for the diagnosis and treatment plan.  All questions were answered to best of my ability and the patient's satisfaction. Mr. Richards was advised to contact the clinic with any questions that may arise after the clinic visit.      Thank you for involving me in the care of the patient    Return to clinic: 6 months      HPI     Maverick Koehleriwona is a 44  year old male who presents for follow-up of spondyloarthropathy    1/30/2020: Mr. Richards reported 5 years of right wrist pain, worse with certain movements that will cause pain for 1-7days that gradually improves and then another thing will trigger it again; was a period of a doing okay with a couple months then he openned the refrigerator door and it happened again; doesn't swell; maybe increased warmth; sharp shooting pain; pain will radiate to the dorsal right 2nd-4th fingers and sometimes to the volar forearm just proximal to the right wrist.  L>R shoulder pain with certain positions and he demonstrates pain with abduction. R>L MCPs>PIPs>DIPs hurt; more pain in the AM, repetitive fine motor movement at work that worsens his pain; when doing heavy lifting prior to this job he had similar symptoms and would need to adjust how he lifted things. Elbows hurt sometimes. Hips are okay.  Knees hurt at the end of the day; improve with rest; no swelling.  Ankles are worse with activity; improve with rest; long history of spraining ankles over and over again; says that he has been eval'd several times including orthopedics and has been told to rest, splint sometimes with ACE bandages, and ice.  Toes are okay.  Morning stiffness for 1 hour, better with a hot shower; +gelling.  He then says that while he is active during the day he feels so much better, and if he sits down to rest then he feels terrible because it is so hard to get going again.  He says that when he gets home he has to get everything done right away because if he sits to relax then it is hard to get up again.     5/6/2020: no change in symptoms.  Didn't start Humira because of anticipation that it may change the colonoscopy results; wasn't concerned about COVID19 related issues.  Still with pain and stiffness and plans to start Humira for these symptoms as he doesn't know when the colonoscopy will be able to be done.     9/25/2020: Still with bloody stools  intermittently but not for the last few weeks.  Pain at his MCPs more than the PIPs but also involving the DIPs that is worse in the morning and improves with time and activity.  Too much fine motor movement also bothers his hands.  Morning stiffness for at least 1 hour but is better with a hot shower.  Positive gelling phenomenon.  Lower back stiffness and pain is worse in the morning and improves with time and activity.  Had a couple times of sharp lower back pain that radiated to his right leg and he went to a chiropractor but does not want to keep going to a chiropractor he says.  Continues on high-dose vitamin D but is now out.  Left shoulder pain with abduction above 90 degrees; range of motion intact per patient.      7/9/2021: First time evaluated since he started Humira.  He says that when he was on Humira consistently he felt much better.  Now taking Humira inconsistently and feels worse, with worsening morning stiffness and pain in his hands and back.  Pain and stiffness improves with time and activity.  Not having any bloody stools.    11/5/2021: Currently doing well with Humira he says.  Tolerating Humira well.  However, still has 1-2 hours of morning stiffness affecting his lower back, and ache in his lower back and is worse in the morning improves with time and activity.  Bowel symptoms have improved; no constipation or diarrhea no blood in his stool.  Has not had a colonoscopy.    5/5/2022: Tolerating Humira well and it is effective.  Morning stiffness for no more than 20 minutes, if present at all.  No joint swelling.  No rash.  No eye irritation.  No black or bloody stools.  No constipation or diarrhea.  Sometimes with external rotation of the hip he will have hip pain that may last for a couple hours, does not radiate, and generally resolve spontaneously.  Bilateral shoulder pain occasionally occurs with different movements; also last for no more than a couple hours at a time and does not occur  every day.  Has been to a chiropractor for his hips and shoulders and this has been helpful.  Not doing stretching exercises.    9/8/2022: Does well most of the time, except for having flares approximately once every 2 months affecting his hips where he may be doing something such as getting out of the car and suddenly have sharp pain in the hip that is nonradiating and not clear if it is associated with other joint involvement.  He reports though that often the hip pain is bilateral when it occurs.  Hip symptoms typically improve quickly, but do not resolve until 1-7 days after the initial sharp pain.  Did not go to physical therapy yet but would like to do so.  No black or bloody stool.  No abdominal pain.  No eye pain or redness.    3/16/2023: Had issues with the Humira co-pay assistance program and missed a dose Humira with subsequent worsening of joint pain and stiffness, to the point where he had difficulty getting out of bed.  Morning stiffness at that time for about 1-2 hours.  After getting the co-pay assistance program squared away he was able to get Humira and is doing well now.  No joint pain or swelling.  No morning stiffness or gelling phenomenon.    7/13/2023: Physical therapy for his shoulders and hips have been very effective.  Now with only occasional sharp pain for a couple seconds that occurs no more than once daily.  No joint swelling.  Humira is effective.  No missed doses of Humira.  Overall very happy with how well he is doing.  He is going to continue home physical therapy exercises.    1/11/2024: No-show to scheduled appointment    Today, 8/8/2024: Humira has been very effective and he would like to continue Humira if possible but if required by his insurance and he is okay with changing to an adalimumab biosimilar.  No joint pain or swelling.  No morning stiffness or gelling phenomenon.  No rash.  Mildly injected nasal aspect of the left eye for about 2 weeks with some discharge from the  left eye; had an ear infection on the left side 2 weeks ago as well; no eye pain and no blurry vision; he says he has an ophthalmologist and will follow-up with his ophthalmologist.    Denies fevers, chills, nausea, vomiting, constipation, diarrhea. No abdominal pain.  No black or bloody stools.  No chest pain/pressure, palpitations, or shortness of breath. No LE swelling. No neck pain. No oral or nasal sores.  No rash. No sicca symptoms.      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 No pertinent past medical history.  Past Surgical History     Past Surgical History:   Procedure Laterality Date    ENT SURGERY      tonsilectomy age 1     Allergy   No Known Allergies  Current Medication List     Current Outpatient Medications   Medication Sig Dispense Refill    ketoconazole (NIZORAL) 2 % external shampoo Use small amount as shampoo and face/body wash every other day. 120 mL 11    medical cannabis (Patient's own supply) See Admin Instructions (The purpose of this order is to document that the patient reports taking medical cannabis.  This is not a prescription, and is not used to certify that the patient has a qualifying medical condition.)      naproxen (NAPROSYN) 500 MG tablet For flare of inflammatory arthritis: naproxen 500 mg twice daily (with meals) for 1-3 weeks.  Take as directed. 42 tablet 0    terbinafine (LAMISIL) 1 % external cream Apply topically 2 times daily For torso 42 g 3    triamcinolone (KENALOG) 0.1 % external cream Apply a thin layer twice daily to affected areas as needed. 160 g 11    triamcinolone (KENALOG) 0.1 % external ointment Apply  "topically 2 times daily For armpits 80 g 3    vitamin D3 (CHOLECALCIFEROL) 50 mcg (2000 units) tablet Take 1 tablet (50 mcg) by mouth daily 90 tablet 3    adalimumab (HUMIRA *CF*) 40 MG/0.4ML pen kit Inject 0.4 mLs (40 mg) subcutaneously every 14 days . Hold for signs of infection, then seek medical attention. (Patient not taking: Reported on 8/8/2024) 0.8 mL 7    minocycline (MINOCIN) 100 MG capsule Take 1 capsule (100 mg) by mouth 2 times daily (Patient not taking: Reported on 8/8/2024) 60 capsule 1    valACYclovir (VALTREX) 1000 mg tablet Take 1 tablet (1,000 mg) by mouth 2 times daily for 10 days 20 tablet 0     No current facility-administered medications for this visit.         Social History   See HPI    Family History   No known family history of rheumatologic disorder    Physical Exam     Temp Readings from Last 3 Encounters:   01/19/23 98.8  F (37.1  C) (Oral)   10/26/22 98.2  F (36.8  C) (Oral)   04/21/21 98.3  F (36.8  C) (Oral)     BP Readings from Last 5 Encounters:   08/08/24 (!) 153/73   07/13/23 124/79   03/16/23 136/86   01/19/23 128/76   10/26/22 124/81     Pulse Readings from Last 1 Encounters:   08/08/24 89     Resp Readings from Last 1 Encounters:   08/08/24 16     Estimated body mass index is 27.76 kg/m  as calculated from the following:    Height as of 1/19/23: 1.676 m (5' 6\").    Weight as of this encounter: 78 kg (172 lb).      GEN: NAD. Healthy appearing adult.   HEENT: Injected sclera of the left eye, nasal aspect.. No obvious external lesions of the ear and nose. Hearing intact.  CV: S1, S2. RRR. No m/r/g  PULM: No increased work of breathing. CTA bilaterally   MSK: MCPs, PIPs, DIPs without swelling or tenderness to palpation.  Wrists without swelling or tenderness to palpation.  Elbows and shoulders without swelling or tenderness to palpation.   Knees, ankles, and MTPs without swelling or tenderness to palpation.    SKIN: No rash or jaundice seen  PSYCH: Alert. Appropriate.       Labs / " Imaging (select studies)     RF/CCP  Recent Labs   Lab Test 01/30/20  0950   CCPIGG 1   RHF <20     CBC  Recent Labs   Lab Test 07/10/23  1536 01/19/23  0826 09/06/22  0717 11/05/21  0836 02/10/21  1630 01/30/20  0950   WBC 6.7 5.0 6.3   < > 7.7 6.5   RBC 4.86 5.86 4.78   < > 4.95 5.00   HGB 13.8 16.9 14.3   < > 14.2 14.3   HCT 41.1 50.7 43.5   < > 44.3 43.6   MCV 85 87 91   < > 90 87   RDW 13.2 13.7 12.8   < > 13.6 13.1    204 270   < > 296 265   MCH 28.4 28.8 29.9   < > 28.7 28.6   MCHC 33.6 33.3 32.9   < > 32.1 32.8   NEUTROPHIL 45 66 35   < > 40.1 40.6   LYMPH 45 25 52   < > 48.7 48.7   MONOCYTE 7 7 9   < > 8.0 7.7   EOSINOPHIL 2 1 3   < > 2.7 2.5   BASOPHIL 1 0 1   < > 0.5 0.5   ANEU  --   --   --   --  3.1 2.6   ALYM  --   --   --   --  3.8 3.2   MASTER  --   --   --   --  0.6 0.5   AEOS  --   --   --   --  0.2 0.2   ABAS  --   --   --   --  0.0 0.0   ANEUTAUTO 3.0 3.3 2.2   < >  --   --    ALYMPAUTO 3.0 1.3 3.4   < >  --   --    AMONOAUTO 0.5 0.4 0.5   < >  --   --    AEOSAUTO 0.1 0.1 0.2   < >  --   --    ABSBASO 0.1 0.0 0.0   < >  --   --     < > = values in this interval not displayed.     CMP  Recent Labs   Lab Test 07/10/23  1536 09/06/22  0717 11/05/21  0836 02/10/21  1630 01/30/20  0950   CR 0.90 0.95 0.95 0.95 0.90   GFRESTIMATED >90 >90 >90 >90 >90   GFRESTBLACK  --   --   --  >90 >90   BILITOTAL 0.4 0.4 0.5 0.2 0.5   ALBUMIN 4.5 3.9 3.8 4.1 3.9   PROTTOTAL 7.4 7.3 7.0 7.3 7.5   ALKPHOS 64 58 61 70 64   AST 35 19 26 26 22   ALT 23 25 42 38 37     Calcium/VitaminD  Recent Labs   Lab Test 09/06/22  0717 02/10/21  1630 01/30/20  0950   VITDT 26 21 10*     ESR/CRP  Recent Labs   Lab Test 07/10/23  1536 09/06/22  0717 11/05/21  0836 02/10/21  1630   SED 8 8 7 5   CRP  --  <2.9 <2.9 <2.9   CRPI <3.00  --   --   --      Hepatitis B  Recent Labs   Lab Test 01/30/20  0950   HBCAB Nonreactive   HEPBANG Nonreactive     Hepatitis C  Recent Labs   Lab Test 01/19/23  0826 01/30/20  0950   HCVAB Nonreactive  Nonreactive     Lyme ab screening  Recent Labs   Lab Test 01/30/20  0950   LYMEGM 0.19       Tuberculosis Screening  Recent Labs   Lab Test 07/10/23  1536 11/05/21  0836 02/05/20  1545   TBRES Negative Negative Negative     HIV Screening  Recent Labs   Lab Test 01/19/23  0826   HIAGAB Nonreactive     Recent Results (from the past 744 hour(s))   XR Sacroiliac Joint 1/2 Views    Narrative    SACROILIAC JOINT ONE TO TWO VIEWS   1/30/2020 10:36 AM     HISTORY:  Sacroiliitis question. Multiple joint pain.      Impression    IMPRESSION: I suspect mild right sacroiliitis versus degenerative  change.    MARIAN MARSHALL MD   XR Chest 2 Views    Narrative    XR CHEST 2 VW   1/30/2020 10:36 AM    INDICATION: Multiple joint pain.    COMPARISON: No pertinent comparison study is available for review.    FINDINGS:   No focal pulmonary infiltrate, pleural effusion, or pneumothorax. The  cardiac size and mediastinal contours appear within normal limits.     No acute fracture or dislocation. No discrete osseous lesion or  radiopaque foreign body.     CONCLUSION:  No acute abnormality.    ELIZABETH KAT MD   XR Foot Bilateral G/E 3 Views    Narrative    FOOT BILATERAL THREE OR MORE VIEWS   1/30/2020 11:29 AM     HISTORY:  Polyarticular joint pain; evaluating for inflammatory  arthritis or other etiology to explain symptoms. Multiple joint pain.      Impression    IMPRESSION: Unremarkable exam.    MARIAN MARSHALL MD   XR Hand Bilateral G/E 3 Views    Narrative    HAND BILATERAL THREE OR MORE VIEWS 1/30/2020 11:29 AM     HISTORY: Polyarticular joint pain. Evaluating for inflammatory  arthritis or other etiology to explain symptoms. Multiple joint pain.      Impression    IMPRESSION:   1. Left: Positive ulnar variance. 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  2. Right: Unremarkable.    MARIAN MARSHALL MD         Immunization History     Immunization History   Administered Date(s) Administered    COVID-19 MONOVALENT 12+ (Pfizer) 03/17/2021, 04/07/2021     Pneumo Conj 13-V (2010&after) 05/05/2022    Pneumococcal 23 valent 09/08/2022    TDAP (Adacel,Boostrix) 11/05/2021    Td (Adult), Adsorbed 04/19/1999          Chart documentation done in part with Dragon Voice recognition Software. Although reviewed after completion, some word and grammatical error may remain.        Miguel Huerta MD

## 2024-08-10 LAB
GAMMA INTERFERON BACKGROUND BLD IA-ACNC: 0.04 IU/ML
M TB IFN-G BLD-IMP: NEGATIVE
M TB IFN-G CD4+ BCKGRND COR BLD-ACNC: 9.96 IU/ML
MITOGEN IGNF BCKGRD COR BLD-ACNC: 0.04 IU/ML
MITOGEN IGNF BCKGRD COR BLD-ACNC: 0.07 IU/ML
QUANTIFERON MITOGEN: 10 IU/ML
QUANTIFERON NIL TUBE: 0.04 IU/ML
QUANTIFERON TB1 TUBE: 0.08 IU/ML
QUANTIFERON TB2 TUBE: 0.11

## 2024-08-31 ENCOUNTER — HEALTH MAINTENANCE LETTER (OUTPATIENT)
Age: 44
End: 2024-08-31